# Patient Record
Sex: FEMALE | Race: BLACK OR AFRICAN AMERICAN | NOT HISPANIC OR LATINO | URBAN - METROPOLITAN AREA
[De-identification: names, ages, dates, MRNs, and addresses within clinical notes are randomized per-mention and may not be internally consistent; named-entity substitution may affect disease eponyms.]

---

## 2021-07-02 ENCOUNTER — INPATIENT (INPATIENT)
Facility: HOSPITAL | Age: 41
LOS: 5 days | Discharge: HOME | End: 2021-07-08
Attending: INTERNAL MEDICINE | Admitting: INTERNAL MEDICINE
Payer: COMMERCIAL

## 2021-07-02 VITALS
RESPIRATION RATE: 24 BRPM | HEART RATE: 113 BPM | WEIGHT: 220.02 LBS | DIASTOLIC BLOOD PRESSURE: 80 MMHG | SYSTOLIC BLOOD PRESSURE: 116 MMHG | TEMPERATURE: 97 F | OXYGEN SATURATION: 91 % | HEIGHT: 63 IN

## 2021-07-02 PROCEDURE — 99284 EMERGENCY DEPT VISIT MOD MDM: CPT

## 2021-07-02 RX ORDER — ACETAMINOPHEN 500 MG
650 TABLET ORAL ONCE
Refills: 0 | Status: COMPLETED | OUTPATIENT
Start: 2021-07-02 | End: 2021-07-02

## 2021-07-02 RX ORDER — ALBUTEROL 90 UG/1
2 AEROSOL, METERED ORAL ONCE
Refills: 0 | Status: COMPLETED | OUTPATIENT
Start: 2021-07-02 | End: 2021-07-02

## 2021-07-02 RX ORDER — SODIUM CHLORIDE 9 MG/ML
1000 INJECTION, SOLUTION INTRAVENOUS ONCE
Refills: 0 | Status: COMPLETED | OUTPATIENT
Start: 2021-07-02 | End: 2021-07-02

## 2021-07-02 RX ORDER — IPRATROPIUM BROMIDE 0.2 MG/ML
1 SOLUTION, NON-ORAL INHALATION ONCE
Refills: 0 | Status: COMPLETED | OUTPATIENT
Start: 2021-07-02 | End: 2021-07-02

## 2021-07-03 DIAGNOSIS — U07.1 COVID-19: ICD-10-CM

## 2021-07-03 LAB
ALBUMIN SERPL ELPH-MCNC: 2.9 G/DL — LOW (ref 3.3–5)
ALBUMIN SERPL ELPH-MCNC: 3.2 G/DL — LOW (ref 3.3–5)
ALP SERPL-CCNC: 51 U/L — SIGNIFICANT CHANGE UP (ref 40–120)
ALP SERPL-CCNC: 57 U/L — SIGNIFICANT CHANGE UP (ref 40–120)
ALT FLD-CCNC: 69 U/L — SIGNIFICANT CHANGE UP (ref 12–78)
ALT FLD-CCNC: 78 U/L — SIGNIFICANT CHANGE UP (ref 12–78)
ANION GAP SERPL CALC-SCNC: 9 MMOL/L — SIGNIFICANT CHANGE UP (ref 5–17)
APPEARANCE UR: CLEAR — SIGNIFICANT CHANGE UP
APTT BLD: 29.8 SEC — SIGNIFICANT CHANGE UP (ref 27.5–35.5)
AST SERPL-CCNC: 29 U/L — SIGNIFICANT CHANGE UP (ref 15–37)
AST SERPL-CCNC: 31 U/L — SIGNIFICANT CHANGE UP (ref 15–37)
BACTERIA # UR AUTO: ABNORMAL
BASOPHILS # BLD AUTO: 0 K/UL — SIGNIFICANT CHANGE UP (ref 0–0.2)
BASOPHILS NFR BLD AUTO: 0 % — SIGNIFICANT CHANGE UP (ref 0–2)
BILIRUB DIRECT SERPL-MCNC: 0.15 MG/DL — SIGNIFICANT CHANGE UP (ref 0.05–0.2)
BILIRUB INDIRECT FLD-MCNC: 0.2 MG/DL — SIGNIFICANT CHANGE UP (ref 0.2–1)
BILIRUB SERPL-MCNC: 0.4 MG/DL — SIGNIFICANT CHANGE UP (ref 0.2–1.2)
BILIRUB SERPL-MCNC: 0.4 MG/DL — SIGNIFICANT CHANGE UP (ref 0.2–1.2)
BILIRUB UR-MCNC: NEGATIVE — SIGNIFICANT CHANGE UP
BUN SERPL-MCNC: 8 MG/DL — SIGNIFICANT CHANGE UP (ref 7–23)
CALCIUM SERPL-MCNC: 9 MG/DL — SIGNIFICANT CHANGE UP (ref 8.5–10.1)
CHLORIDE SERPL-SCNC: 99 MMOL/L — SIGNIFICANT CHANGE UP (ref 96–108)
CK SERPL-CCNC: 232 U/L — HIGH (ref 26–192)
CO2 SERPL-SCNC: 27 MMOL/L — SIGNIFICANT CHANGE UP (ref 22–31)
COLOR SPEC: YELLOW — SIGNIFICANT CHANGE UP
CREAT SERPL-MCNC: 0.87 MG/DL — SIGNIFICANT CHANGE UP (ref 0.5–1.3)
D DIMER BLD IA.RAPID-MCNC: 529 NG/ML DDU — HIGH
DIFF PNL FLD: ABNORMAL
EOSINOPHIL # BLD AUTO: 0 K/UL — SIGNIFICANT CHANGE UP (ref 0–0.5)
EOSINOPHIL NFR BLD AUTO: 0 % — SIGNIFICANT CHANGE UP (ref 0–6)
EPI CELLS # UR: ABNORMAL
FERRITIN SERPL-MCNC: 272 NG/ML — HIGH (ref 15–150)
FIBRINOGEN PPP-MCNC: 780 MG/DL — HIGH (ref 290–520)
FLUAV AG NPH QL: SIGNIFICANT CHANGE UP
FLUBV AG NPH QL: SIGNIFICANT CHANGE UP
GLUCOSE SERPL-MCNC: 108 MG/DL — HIGH (ref 70–99)
GLUCOSE UR QL: NEGATIVE MG/DL — SIGNIFICANT CHANGE UP
HCG SERPL-ACNC: <1 MIU/ML — SIGNIFICANT CHANGE UP
HCT VFR BLD CALC: 38 % — SIGNIFICANT CHANGE UP (ref 34.5–45)
HGB BLD-MCNC: 12.4 G/DL — SIGNIFICANT CHANGE UP (ref 11.5–15.5)
INR BLD: 1.23 RATIO — HIGH (ref 0.88–1.16)
KETONES UR-MCNC: ABNORMAL
LACTATE SERPL-SCNC: 0.9 MMOL/L — SIGNIFICANT CHANGE UP (ref 0.7–2)
LEUKOCYTE ESTERASE UR-ACNC: ABNORMAL
LYMPHOCYTES # BLD AUTO: 1.73 K/UL — SIGNIFICANT CHANGE UP (ref 1–3.3)
LYMPHOCYTES # BLD AUTO: 44 % — SIGNIFICANT CHANGE UP (ref 13–44)
MANUAL SMEAR VERIFICATION: SIGNIFICANT CHANGE UP
MCHC RBC-ENTMCNC: 28.4 PG — SIGNIFICANT CHANGE UP (ref 27–34)
MCHC RBC-ENTMCNC: 32.6 GM/DL — SIGNIFICANT CHANGE UP (ref 32–36)
MCV RBC AUTO: 87 FL — SIGNIFICANT CHANGE UP (ref 80–100)
MONOCYTES # BLD AUTO: 0.39 K/UL — SIGNIFICANT CHANGE UP (ref 0–0.9)
MONOCYTES NFR BLD AUTO: 10 % — SIGNIFICANT CHANGE UP (ref 2–14)
NEUTROPHILS # BLD AUTO: 1.69 K/UL — LOW (ref 1.8–7.4)
NEUTROPHILS NFR BLD AUTO: 43 % — SIGNIFICANT CHANGE UP (ref 43–77)
NITRITE UR-MCNC: NEGATIVE — SIGNIFICANT CHANGE UP
NRBC # BLD: 0 /100 — SIGNIFICANT CHANGE UP (ref 0–0)
NRBC # BLD: SIGNIFICANT CHANGE UP /100 WBCS (ref 0–0)
NT-PROBNP SERPL-SCNC: 14 PG/ML — SIGNIFICANT CHANGE UP (ref 0–125)
PH UR: 5 — SIGNIFICANT CHANGE UP (ref 5–8)
PLAT MORPH BLD: NORMAL — SIGNIFICANT CHANGE UP
PLATELET # BLD AUTO: 252 K/UL — SIGNIFICANT CHANGE UP (ref 150–400)
POTASSIUM SERPL-MCNC: 4.1 MMOL/L — SIGNIFICANT CHANGE UP (ref 3.5–5.3)
POTASSIUM SERPL-SCNC: 4.1 MMOL/L — SIGNIFICANT CHANGE UP (ref 3.5–5.3)
PROCALCITONIN SERPL-MCNC: 0.04 NG/ML — SIGNIFICANT CHANGE UP (ref 0.02–0.1)
PROT SERPL-MCNC: 7.8 GM/DL — SIGNIFICANT CHANGE UP (ref 6–8.3)
PROT SERPL-MCNC: 8.6 GM/DL — HIGH (ref 6–8.3)
PROT UR-MCNC: 30 MG/DL
PROTHROM AB SERPL-ACNC: 14.1 SEC — HIGH (ref 10.6–13.6)
RBC # BLD: 4.37 M/UL — SIGNIFICANT CHANGE UP (ref 3.8–5.2)
RBC # FLD: 13.4 % — SIGNIFICANT CHANGE UP (ref 10.3–14.5)
RBC BLD AUTO: NORMAL — SIGNIFICANT CHANGE UP
RBC CASTS # UR COMP ASSIST: ABNORMAL /HPF (ref 0–4)
SARS-COV-2 RNA SPEC QL NAA+PROBE: DETECTED
SODIUM SERPL-SCNC: 135 MMOL/L — SIGNIFICANT CHANGE UP (ref 135–145)
SP GR SPEC: 1.02 — SIGNIFICANT CHANGE UP (ref 1.01–1.02)
TROPONIN I SERPL-MCNC: <.015 NG/ML — SIGNIFICANT CHANGE UP (ref 0.01–0.04)
UROBILINOGEN FLD QL: 1 MG/DL
VARIANT LYMPHS # BLD: 3 % — SIGNIFICANT CHANGE UP (ref 0–6)
WBC # BLD: 3.93 K/UL — SIGNIFICANT CHANGE UP (ref 3.8–10.5)
WBC # FLD AUTO: 3.93 K/UL — SIGNIFICANT CHANGE UP (ref 3.8–10.5)
WBC UR QL: SIGNIFICANT CHANGE UP

## 2021-07-03 PROCEDURE — 71275 CT ANGIOGRAPHY CHEST: CPT | Mod: 26

## 2021-07-03 PROCEDURE — 71045 X-RAY EXAM CHEST 1 VIEW: CPT | Mod: 26

## 2021-07-03 PROCEDURE — 99222 1ST HOSP IP/OBS MODERATE 55: CPT

## 2021-07-03 PROCEDURE — 12345: CPT | Mod: NC

## 2021-07-03 RX ORDER — REMDESIVIR 5 MG/ML
INJECTION INTRAVENOUS
Refills: 0 | Status: COMPLETED | OUTPATIENT
Start: 2021-07-03 | End: 2021-07-07

## 2021-07-03 RX ORDER — REMDESIVIR 5 MG/ML
200 INJECTION INTRAVENOUS EVERY 24 HOURS
Refills: 0 | Status: COMPLETED | OUTPATIENT
Start: 2021-07-03 | End: 2021-07-03

## 2021-07-03 RX ORDER — DEXAMETHASONE 0.5 MG/5ML
6 ELIXIR ORAL DAILY
Refills: 0 | Status: DISCONTINUED | OUTPATIENT
Start: 2021-07-03 | End: 2021-07-08

## 2021-07-03 RX ORDER — ACETAMINOPHEN 500 MG
650 TABLET ORAL EVERY 6 HOURS
Refills: 0 | Status: DISCONTINUED | OUTPATIENT
Start: 2021-07-03 | End: 2021-07-08

## 2021-07-03 RX ORDER — ENOXAPARIN SODIUM 100 MG/ML
40 INJECTION SUBCUTANEOUS EVERY 12 HOURS
Refills: 0 | Status: DISCONTINUED | OUTPATIENT
Start: 2021-07-03 | End: 2021-07-08

## 2021-07-03 RX ORDER — ENOXAPARIN SODIUM 100 MG/ML
40 INJECTION SUBCUTANEOUS DAILY
Refills: 0 | Status: DISCONTINUED | OUTPATIENT
Start: 2021-07-03 | End: 2021-07-03

## 2021-07-03 RX ORDER — REMDESIVIR 5 MG/ML
100 INJECTION INTRAVENOUS EVERY 24 HOURS
Refills: 0 | Status: COMPLETED | OUTPATIENT
Start: 2021-07-04 | End: 2021-07-07

## 2021-07-03 RX ORDER — SENNA PLUS 8.6 MG/1
2 TABLET ORAL AT BEDTIME
Refills: 0 | Status: DISCONTINUED | OUTPATIENT
Start: 2021-07-03 | End: 2021-07-03

## 2021-07-03 RX ADMIN — REMDESIVIR 200 MILLIGRAM(S): 5 INJECTION INTRAVENOUS at 05:42

## 2021-07-03 RX ADMIN — REMDESIVIR 500 MILLIGRAM(S): 5 INJECTION INTRAVENOUS at 03:59

## 2021-07-03 RX ADMIN — SODIUM CHLORIDE 1000 MILLILITER(S): 9 INJECTION, SOLUTION INTRAVENOUS at 04:00

## 2021-07-03 RX ADMIN — ENOXAPARIN SODIUM 40 MILLIGRAM(S): 100 INJECTION SUBCUTANEOUS at 17:41

## 2021-07-03 RX ADMIN — Medication 100 MILLIGRAM(S): at 00:55

## 2021-07-03 RX ADMIN — Medication 6 MILLIGRAM(S): at 08:09

## 2021-07-03 RX ADMIN — ENOXAPARIN SODIUM 40 MILLIGRAM(S): 100 INJECTION SUBCUTANEOUS at 08:08

## 2021-07-03 RX ADMIN — Medication 650 MILLIGRAM(S): at 03:57

## 2021-07-03 RX ADMIN — Medication 1 PUFF(S): at 01:00

## 2021-07-03 RX ADMIN — Medication 650 MILLIGRAM(S): at 00:55

## 2021-07-03 RX ADMIN — ALBUTEROL 2 PUFF(S): 90 AEROSOL, METERED ORAL at 00:55

## 2021-07-03 RX ADMIN — SODIUM CHLORIDE 1000 MILLILITER(S): 9 INJECTION, SOLUTION INTRAVENOUS at 00:54

## 2021-07-03 NOTE — H&P ADULT - ASSESSMENT
Patient is a 40F with no reported PMH who presents to the ED for dyspnea.  Patient admitted for evaluation for COVID19.  Tested positive for COVID on 6/20, quarantined in [place] for 7 days and returned to [facility].  After she returned from [place], symptoms continued with a cough and has progressed to worsening dyspnea over the last few days.  Symptoms worse with activity.  Patient denies hx of fever, chills, nausea, vomiting, or diarrhea.  Patient spoke to her PMDF today who recommended she come to the ED.  Nonsmoker, allergies as per chart. SpO2 as low as 91 on room air.  Vitals stable, labs benign.  Will admit to med surg.    IMPROVE VTE Individual Risk Assessment          RISK                                                          Points  [  ] Previous VTE                                                3  [  ] Thrombophilia                                             2  [  ] Lower limb paralysis                                    2        (unable to hold up >15 seconds)    [  ] Current Cancer                                             2         (within 6 months)  [  ] Immobilization > 24 hrs                              1  [  ] ICU/CCU stay > 24 hours                            1  [  ] Age > 60                                                    1    IMPROVE VTE Score - 0

## 2021-07-03 NOTE — H&P ADULT - PROBLEM SELECTOR PLAN 1
COVID 19 swabbed in ED - +ve results.  Isolation precautions  Tylenol PRN fever  Will start dexamethasone and remdesivir as patient requiring supplemental oxygen

## 2021-07-03 NOTE — H&P ADULT - NSHPPHYSICALEXAM_GEN_ALL_CORE
Physical exam:  General: patient in no acute distress, resting comfortably  Head:  Atraumatic, Normocephalic  Eyes: EOMI, PERRLA, clear sclera  Neck: Supple, thyroid nontender, non enlarged  Cardio: deferred  Resp: deferred  GI: abdomen soft, nontender, non distended, no guarding, BS +ve x 4  Ext: no significant pedal edema  Neuro: CN 2-12 intact, no significant motor or sensory deficits.  Skin: No rashes or lesions

## 2021-07-03 NOTE — PATIENT PROFILE ADULT - NSPRESCRALCAMT_GEN_A_NUR
Patient Seen in: [facility] AND [facility] Emergency Department    History   Patient presents with:  Abscess (integumentary)      HPI    Patient presents complaining of an infection to her left upper back for the past 2 days.   She states there is significant rj [id number]    Physical Exam   Constitutional: She is oriented to person, place, and time. She appears well-developed. No distress. Morbidly obese   HENT:   Head: Normocephalic and atraumatic. Eyes: Conjunctivae are normal. Right eye exhibits no discharge.  L patient's abscess was located to her left upper back. I obtained verbal consent from the patient to drain the abscess. Patient was informed about the possibility of bleeding, pain and worsening of the condition.   The abscess was incised with a scalpel  a 3 or 4

## 2021-07-03 NOTE — H&P ADULT - NSHPREVIEWOFSYSTEMS_GEN_ALL_CORE
Constitutional: no fever, chills, night sweats  Ears: no hearing changes or ear pain,   Nose: no nasal congestion, sinus pain, or rhinorrhea  Cardio: no chest pain, orthopnea, edema, or palpitations  Resp: dyspnea, cough positive.  No wheezing  GI: no nausea, vomiting, diarrhea, constipation, hematochezia, or melena  : no dysuria, urinary frequency, hematuria  MSK: no back pain, neck pain  Skin: no rash, pruritis   Neuro: weakness positive.  No dizziness, lightheadedness, syncope   Heme/Lymph: no bruising or bleeding

## 2021-07-03 NOTE — ED PROVIDER NOTE - PHYSICAL EXAMINATION
Gen: Alert, NAD  Head: NC, AT   Eyes: PERRL, EOMI, normal lids/conjunctiva  ENT: normal hearing, patent oropharynx without erythema/exudate, uvula midline  Neck: supple, no tenderness, Trachea midline  Pulm: right lung dimnished breath sounds with crackles   CV: RRR, no M/R/G, 2+ radial and dp pulses bl, no edema  Abd: soft, NT/ND, +BS, no hepatosplenomegaly  Mskel: extremities x4 with normal ROM and no joint effusions. no ctl spine ttp.   Skin: no rash, no bruising   Neuro: AAOx3, no sensory/motor deficits, CN 2-12 intact

## 2021-07-03 NOTE — ED PROVIDER NOTE - CLINICAL SUMMARY MEDICAL DECISION MAKING FREE TEXT BOX
right lower lobe pna likely related to covid. rule out pe. start remdes. not hypoxic for dex. right lower lobe pna likely related to covid. rule out pe. start remdes. not sufficiently hypoxic for dex.

## 2021-07-03 NOTE — H&P ADULT - NSHPLABSRESULTS_GEN_ALL_CORE
Recent Vitals  T(C): 36.7 (07-03-21 @ 06:16), Max: 37.2 (07-03-21 @ 04:03)  HR: 81 (07-03-21 @ 06:16) (81 - 113)  BP: 122/74 (07-03-21 @ 06:16) (116/80 - 130/78)  RR: 14 (07-03-21 @ 06:16) (14 - 24)  SpO2: 96% (07-03-21 @ 06:16) (91% - 100%)                        12.4   3.93  )-----------( 252      ( 03 Jul 2021 00:29 )             38.0     07-03    135  |  99  |  8   ----------------------------<  108<H>  4.1   |  27  |  0.87    Ca    9.0      03 Jul 2021 00:29    TPro  8.6<H>  /  Alb  3.2<L>  /  TBili  0.4  /  DBili  x   /  AST  31  /  ALT  78  /  AlkPhos  57  07-03    PT/INR - ( 03 Jul 2021 00:29 )   PT: 14.1 sec;   INR: 1.23 ratio         PTT - ( 03 Jul 2021 00:29 )  PTT:29.8 sec  LIVER FUNCTIONS - ( 03 Jul 2021 00:29 )  Alb: 3.2 g/dL / Pro: 8.6 gm/dL / ALK PHOS: 57 U/L / ALT: 78 U/L / AST: 31 U/L / GGT: x               Home Medications:

## 2021-07-03 NOTE — H&P ADULT - HISTORY OF PRESENT ILLNESS
Patient is a 40F with no reported PMH who presents to the ED for dyspnea.  Patient admitted for evaluation for COVID19.  Tested positive for COVID on 6/20 after she returned from [place].  Symptoms started wtih a cough but patient has developed worsening dyspnea over the last few days.  Symptoms worse with activity.  Patient denies hx of fever, chills, nausea, vomiting, or diarrhea.  Patient spoke to her PMDF today who recommended she come to the ED.  Nonsmoker, allergies as per chart.  Vitals stable, labs benign.  Will admit to med surg. Patient is a 40F with no reported PMH who presents to the ED for dyspnea.  Patient admitted for evaluation for COVID19.  Tested positive for COVID on 6/20, quarantined in [place] for 7 days and returned to [facility].  After she returned from [place], symptoms continued with a cough and has progressed to worsening dyspnea over the last few days.  Symptoms worse with activity.  Patient denies hx of fever, chills, nausea, vomiting, or diarrhea.  Patient spoke to her PMDF today who recommended she come to the ED.  Nonsmoker, allergies as per chart. SpO2 as low as 91 on room air.  Vitals stable, labs benign.  Will admit to med surg.

## 2021-07-03 NOTE — ED PROVIDER NOTE - OBJECTIVE STATEMENT
40f no med hx but dx with covid on june 20 after symptoms began that day with a cough. since then pt developed sob over the past few days, worse in particular with ambulation. cough persists. no fever, chills, nausea, vomiting, back pain, dysuria, pregnancy, vision loss, ha. there is no chest tightness. has not taken anything to help with symptoms

## 2021-07-04 LAB
ALBUMIN SERPL ELPH-MCNC: 2.9 G/DL — LOW (ref 3.3–5)
ALBUMIN SERPL ELPH-MCNC: 3 G/DL — LOW (ref 3.3–5)
ALP SERPL-CCNC: 53 U/L — SIGNIFICANT CHANGE UP (ref 40–120)
ALP SERPL-CCNC: 54 U/L — SIGNIFICANT CHANGE UP (ref 40–120)
ALT FLD-CCNC: 60 U/L — SIGNIFICANT CHANGE UP (ref 12–78)
ALT FLD-CCNC: 60 U/L — SIGNIFICANT CHANGE UP (ref 12–78)
ANION GAP SERPL CALC-SCNC: 7 MMOL/L — SIGNIFICANT CHANGE UP (ref 5–17)
AST SERPL-CCNC: 18 U/L — SIGNIFICANT CHANGE UP (ref 15–37)
AST SERPL-CCNC: 19 U/L — SIGNIFICANT CHANGE UP (ref 15–37)
BILIRUB DIRECT SERPL-MCNC: 0.12 MG/DL — SIGNIFICANT CHANGE UP (ref 0.05–0.2)
BILIRUB INDIRECT FLD-MCNC: 0.2 MG/DL — SIGNIFICANT CHANGE UP (ref 0.2–1)
BILIRUB SERPL-MCNC: 0.3 MG/DL — SIGNIFICANT CHANGE UP (ref 0.2–1.2)
BILIRUB SERPL-MCNC: 0.3 MG/DL — SIGNIFICANT CHANGE UP (ref 0.2–1.2)
BUN SERPL-MCNC: 10 MG/DL — SIGNIFICANT CHANGE UP (ref 7–23)
CALCIUM SERPL-MCNC: 9 MG/DL — SIGNIFICANT CHANGE UP (ref 8.5–10.1)
CHLORIDE SERPL-SCNC: 105 MMOL/L — SIGNIFICANT CHANGE UP (ref 96–108)
CK SERPL-CCNC: 126 U/L — SIGNIFICANT CHANGE UP (ref 26–192)
CO2 SERPL-SCNC: 27 MMOL/L — SIGNIFICANT CHANGE UP (ref 22–31)
COVID-19 SPIKE DOMAIN AB INTERP: POSITIVE
COVID-19 SPIKE DOMAIN ANTIBODY RESULT: 41.7 U/ML — HIGH
CREAT SERPL-MCNC: 0.6 MG/DL — SIGNIFICANT CHANGE UP (ref 0.5–1.3)
CREAT SERPL-MCNC: 0.64 MG/DL — SIGNIFICANT CHANGE UP (ref 0.5–1.3)
CRP SERPL-MCNC: 103 MG/L — HIGH
CRP SERPL-MCNC: 44 MG/L — HIGH
D DIMER BLD IA.RAPID-MCNC: 493 NG/ML DDU — HIGH
GLUCOSE SERPL-MCNC: 123 MG/DL — HIGH (ref 70–99)
INR BLD: 1.26 RATIO — HIGH (ref 0.88–1.16)
LDH SERPL L TO P-CCNC: 663 U/L — HIGH (ref 50–242)
MAGNESIUM SERPL-MCNC: 2.5 MG/DL — SIGNIFICANT CHANGE UP (ref 1.6–2.6)
PHOSPHATE SERPL-MCNC: 3.4 MG/DL — SIGNIFICANT CHANGE UP (ref 2.5–4.5)
POTASSIUM SERPL-MCNC: 4.2 MMOL/L — SIGNIFICANT CHANGE UP (ref 3.5–5.3)
POTASSIUM SERPL-SCNC: 4.2 MMOL/L — SIGNIFICANT CHANGE UP (ref 3.5–5.3)
PROT SERPL-MCNC: 7.8 GM/DL — SIGNIFICANT CHANGE UP (ref 6–8.3)
PROT SERPL-MCNC: 7.9 GM/DL — SIGNIFICANT CHANGE UP (ref 6–8.3)
PROTHROM AB SERPL-ACNC: 14.5 SEC — HIGH (ref 10.6–13.6)
SARS-COV-2 IGG+IGM SERPL QL IA: 41.7 U/ML — HIGH
SARS-COV-2 IGG+IGM SERPL QL IA: POSITIVE
SODIUM SERPL-SCNC: 139 MMOL/L — SIGNIFICANT CHANGE UP (ref 135–145)

## 2021-07-04 PROCEDURE — 99232 SBSQ HOSP IP/OBS MODERATE 35: CPT

## 2021-07-04 RX ADMIN — ENOXAPARIN SODIUM 40 MILLIGRAM(S): 100 INJECTION SUBCUTANEOUS at 17:02

## 2021-07-04 RX ADMIN — REMDESIVIR 500 MILLIGRAM(S): 5 INJECTION INTRAVENOUS at 02:54

## 2021-07-04 RX ADMIN — Medication 6 MILLIGRAM(S): at 05:03

## 2021-07-04 RX ADMIN — ENOXAPARIN SODIUM 40 MILLIGRAM(S): 100 INJECTION SUBCUTANEOUS at 05:03

## 2021-07-04 NOTE — PROGRESS NOTE ADULT - ASSESSMENT
40F with no reported PMH admitted with COVID-19.         Assessment and Plan:   Acute respiratory failure sec to COVID-19   CT angio - GGO, no PE   elevated inflamm markers   Tylenol PRN fever  c/w dexamethasone and remdesivir as patient requiring supplemental oxygen.     Precautions per protocol, ideally airborne and contact in negative pressure room. Supplemental oxygen as required to maintain adequate saturation. Vigilance for secondary infection and other superimposed events. Monitor inflammatory markers and lab data. DVT prophylaxis per protocol. Prognosis guarded. Encourage use of incentive spirometer.  proning as tolerated.

## 2021-07-04 NOTE — PROGRESS NOTE ADULT - SUBJECTIVE AND OBJECTIVE BOX
HPI:  Patient is a 40F with no reported PMH who presents to the ED for dyspnea.  Patient admitted for evaluation for COVID19.  Tested positive for COVID on , quarantined in [place] for 7 days and returned to [facility].  After she returned from [place], symptoms continued with a cough and has progressed to worsening dyspnea over the last few days.  Symptoms worse with activity.  Patient denies hx of fever, chills, nausea, vomiting, or diarrhea.  Patient spoke to her PMDF today who recommended she come to the ED.  Nonsmoker, allergies as per chart. SpO2 as low as 91 on room air.  Vitals stable, labs benign.  Will admit to med surg. (2021 06:23)      SUBJECTIVE & OBJECTIVE: Pt seen and examined at bedside.   no overnight events.   states still feels SOB on exertion     Denies fever, chills, N/V, dizziness, HA, cough, CP, palpitations, abdominal pain, dysuria, diarrhea, constipation.     PHYSICAL EXAM:  T(C): 36.3 (21 @ 05:09), Max: 36.7 (21 @ 12:00)  HR: 60 (21 @ 05:09) (60 - 90)  BP: 117/73 (21 @ 05:09) (105/80 - 129/87)  RR: 18 (21 @ 05:09) (18 - 21)  SpO2: 99% (21 @ 05:09) (94% - 99%)  Wt(kg): -- Height (cm): 165.1 ( 21:19)  Weight (kg): 100.6 ( 21:19)  BMI (kg/m2): 36.9 ( 21:19)  BSA (m2): 2.07 ( 21:19)  I&O's Detail    2021 07:01  -  2021 07:00  --------------------------------------------------------  IN:    IV PiggyBack: 250 mL    Oral Fluid: 1157 mL  Total IN: 1407 mL    OUT:    Voided (mL): 1100 mL  Total OUT: 1100 mL    Total NET: 307 mL    GENERAL: NAD well-developed,  not using accessory muscles, speaking in full sentences   HEAD:  Atraumatic, Normocephalic  EYES: EOMI, PERRLA, conjunctiva and sclera clear  ENMT: No tonsillar erythema, exudates, or enlargement; Moist mucous membranes  NECK: Supple, No JVD  NERVOUS SYSTEM:  Alert & Oriented X3, Good concentration; no focal neuro deficits   CHEST/LUNG: CTAB, no r/r/w  HEART: Regular rate and rhythm; No murmurs, rubs, or gallops  ABDOMEN: Soft, Nontender, Nondistended; Bowel sounds present  EXTREMITIES:  2+ Peripheral Pulses b/l, No clubbing, cyanosis, calf tenderness or edema b/l           MEDICATIONS  (STANDING):  dexAMETHasone  Injectable 6 milliGRAM(s) IV Push daily  enoxaparin Injectable 40 milliGRAM(s) SubCutaneous every 12 hours  remdesivir  IVPB   IV Intermittent   remdesivir  IVPB 100 milliGRAM(s) IV Intermittent every 24 hours    MEDICATIONS  (PRN):  acetaminophen   Tablet .. 650 milliGRAM(s) Oral every 6 hours PRN Moderate Pain (4 - 6)      LABS:                        12.4   3.93  )-----------( 252      ( 2021 00:29 )             38.0     07-03    135  |  99  |  8   ----------------------------<  108<H>  4.1   |  27  |  0.87    Ca    9.0      2021 00:29    TPro  7.8  /  Alb  2.9<L>  /  TBili  0.4  /  DBili  .15  /  AST  29  /  ALT  69  /  AlkPhos  51  07-03    PT/INR - ( 2021 00:29 )   PT: 14.1 sec;   INR: 1.23 ratio         PTT - ( 2021 00:29 )  PTT:29.8 sec  Urinalysis Basic - ( 2021 06:30 )    Color: Yellow / Appearance: Clear / S.020 / pH: x  Gluc: x / Ketone: Moderate  / Bili: Negative / Urobili: 1 mg/dL   Blood: x / Protein: 30 mg/dL / Nitrite: Negative   Leuk Esterase: Trace / RBC: 3-5 /HPF / WBC 0-2   Sq Epi: x / Non Sq Epi: Moderate / Bacteria: Moderate        CAPILLARY BLOOD GLUCOSE          CARDIAC MARKERS ( 2021 00:02 )  x     / x     / 126 U/L / x     / x      CARDIAC MARKERS ( 2021 00:29 )  <.015 ng/mL / x     / 232 U/L / x     / x            RECENT CULTURES:      RADIOLOGY & ADDITIONAL TESTS:  < from: CT Angio Chest PE Protocol w/ IV Cont (21 @ 06:35) >  INTERPRETATION:  INDICATION, CLINICAL INFORMATION: Shortness of breath, COVID19 infection    TECHNIQUE: CT pulmonary angiogram of the chest was performed. Coronal and sagittal images were reconstructed. Maximum intensity projection images were generated. Images were obtained after the uneventful administration of 99 cc nonionic intravenous Omnipaque 350. 1 cc of Omnipaque 350 was discarded.      COMPARISON: 2015.    FINDINGS:    PULMONARY VESSELS: No pulmonary embolus. Main pulmonary artery normal in diameter.    HEART AND VASCULATURE: Heart size is normal. No pericardial effusion. No aortic aneurysm or dissection.    LUNGS, AIRWAYS, PLEURA: Patent central airways. Mild basilar predominant reticular and groundglass opacities. No pleural effusions or pneumothorax.    MEDIASTINUM: Borderline and subcentimeter bilateral hilar lymph nodes.    UPPER ABDOMEN: Within normal limits.    BONES AND SOFT TISSUES: No aggressive osseous lesion.    LOWER NECK: Within normal limits.    IMPRESSION:    No pulmonary embolus.    Mild basilar predominant reticular and groundglass opacities.    < end of copied text >    UA contaminated, patient asymptomatic, no urinary complaints          Imaging Personally Reviewed:  [ ] YES  [ ] NO    Consultant(s) Notes Reviewed:  [x ] YES  [ ] NO    Care Discussed with Consultants/Other Providers [x ] YES  [ ] NO  Care discussed in detail with patient.  All questions and concerns addressed

## 2021-07-05 LAB
ALBUMIN SERPL ELPH-MCNC: 2.9 G/DL — LOW (ref 3.3–5)
ALP SERPL-CCNC: 48 U/L — SIGNIFICANT CHANGE UP (ref 40–120)
ALT FLD-CCNC: 47 U/L — SIGNIFICANT CHANGE UP (ref 12–78)
ANION GAP SERPL CALC-SCNC: 5 MMOL/L — SIGNIFICANT CHANGE UP (ref 5–17)
AST SERPL-CCNC: 11 U/L — LOW (ref 15–37)
BILIRUB DIRECT SERPL-MCNC: 0.09 MG/DL — SIGNIFICANT CHANGE UP (ref 0.05–0.2)
BILIRUB SERPL-MCNC: 0.2 MG/DL — SIGNIFICANT CHANGE UP (ref 0.2–1.2)
BUN SERPL-MCNC: 12 MG/DL — SIGNIFICANT CHANGE UP (ref 7–23)
CALCIUM SERPL-MCNC: 8.5 MG/DL — SIGNIFICANT CHANGE UP (ref 8.5–10.1)
CHLORIDE SERPL-SCNC: 107 MMOL/L — SIGNIFICANT CHANGE UP (ref 96–108)
CO2 SERPL-SCNC: 29 MMOL/L — SIGNIFICANT CHANGE UP (ref 22–31)
CREAT SERPL-MCNC: 0.64 MG/DL — SIGNIFICANT CHANGE UP (ref 0.5–1.3)
GLUCOSE SERPL-MCNC: 115 MG/DL — HIGH (ref 70–99)
HCT VFR BLD CALC: 33.1 % — LOW (ref 34.5–45)
HGB BLD-MCNC: 10.6 G/DL — LOW (ref 11.5–15.5)
INR BLD: 1.36 RATIO — HIGH (ref 0.88–1.16)
MAGNESIUM SERPL-MCNC: 2.5 MG/DL — SIGNIFICANT CHANGE UP (ref 1.6–2.6)
MCHC RBC-ENTMCNC: 28 PG — SIGNIFICANT CHANGE UP (ref 27–34)
MCHC RBC-ENTMCNC: 32 GM/DL — SIGNIFICANT CHANGE UP (ref 32–36)
MCV RBC AUTO: 87.3 FL — SIGNIFICANT CHANGE UP (ref 80–100)
NRBC # BLD: 0 /100 WBCS — SIGNIFICANT CHANGE UP (ref 0–0)
PHOSPHATE SERPL-MCNC: 3.2 MG/DL — SIGNIFICANT CHANGE UP (ref 2.5–4.5)
PLATELET # BLD AUTO: 327 K/UL — SIGNIFICANT CHANGE UP (ref 150–400)
POTASSIUM SERPL-MCNC: 4.1 MMOL/L — SIGNIFICANT CHANGE UP (ref 3.5–5.3)
POTASSIUM SERPL-SCNC: 4.1 MMOL/L — SIGNIFICANT CHANGE UP (ref 3.5–5.3)
PROT SERPL-MCNC: 7.2 GM/DL — SIGNIFICANT CHANGE UP (ref 6–8.3)
PROTHROM AB SERPL-ACNC: 15.6 SEC — HIGH (ref 10.6–13.6)
RBC # BLD: 3.79 M/UL — LOW (ref 3.8–5.2)
RBC # FLD: 13.4 % — SIGNIFICANT CHANGE UP (ref 10.3–14.5)
SODIUM SERPL-SCNC: 141 MMOL/L — SIGNIFICANT CHANGE UP (ref 135–145)
WBC # BLD: 6.92 K/UL — SIGNIFICANT CHANGE UP (ref 3.8–10.5)
WBC # FLD AUTO: 6.92 K/UL — SIGNIFICANT CHANGE UP (ref 3.8–10.5)

## 2021-07-05 PROCEDURE — 99232 SBSQ HOSP IP/OBS MODERATE 35: CPT

## 2021-07-05 RX ORDER — PANTOPRAZOLE SODIUM 20 MG/1
40 TABLET, DELAYED RELEASE ORAL
Refills: 0 | Status: DISCONTINUED | OUTPATIENT
Start: 2021-07-05 | End: 2021-07-08

## 2021-07-05 RX ADMIN — PANTOPRAZOLE SODIUM 40 MILLIGRAM(S): 20 TABLET, DELAYED RELEASE ORAL at 05:37

## 2021-07-05 RX ADMIN — REMDESIVIR 500 MILLIGRAM(S): 5 INJECTION INTRAVENOUS at 02:58

## 2021-07-05 RX ADMIN — ENOXAPARIN SODIUM 40 MILLIGRAM(S): 100 INJECTION SUBCUTANEOUS at 05:37

## 2021-07-05 RX ADMIN — Medication 6 MILLIGRAM(S): at 05:37

## 2021-07-05 RX ADMIN — ENOXAPARIN SODIUM 40 MILLIGRAM(S): 100 INJECTION SUBCUTANEOUS at 17:24

## 2021-07-05 RX ADMIN — Medication 30 MILLILITER(S): at 00:38

## 2021-07-05 NOTE — DIETITIAN INITIAL EVALUATION ADULT. - ORAL INTAKE PTA/DIET HISTORY
Pt reports poor appetite and PO intake x 7 days PTA due to feeling unwell. States no diet restrictions PTA.

## 2021-07-05 NOTE — DIETITIAN NUTRITION RISK NOTIFICATION - TREATMENT: THE FOLLOWING DIET HAS BEEN RECOMMENDED
Diet, Regular:   Supplement Feeding Modality:  Oral  Ensure Enlive Cans or Servings Per Day:  1       Frequency:  Daily (07-05-21 @ 14:58) [Pending Verification By Attending]  Diet, Regular (07-03-21 @ 08:56) [Active]

## 2021-07-05 NOTE — DIETITIAN INITIAL EVALUATION ADULT. - PERTINENT MEDS FT
MEDICATIONS  (STANDING):  dexAMETHasone  Injectable 6 milliGRAM(s) IV Push daily  enoxaparin Injectable 40 milliGRAM(s) SubCutaneous every 12 hours  pantoprazole    Tablet 40 milliGRAM(s) Oral before breakfast  remdesivir  IVPB   IV Intermittent   remdesivir  IVPB 100 milliGRAM(s) IV Intermittent every 24 hours    MEDICATIONS  (PRN):  acetaminophen   Tablet .. 650 milliGRAM(s) Oral every 6 hours PRN Moderate Pain (4 - 6)

## 2021-07-05 NOTE — DIETITIAN INITIAL EVALUATION ADULT. - OTHER INFO
Pt COVID+; on contact isolation. Met with pt at bedside. Pt reports good appetite and PO intake during LOS. Per flow sheets pt consuming % of documented meals. Denies difficulty chewing/swallowing. Pt denies nausea, vomiting, diarrhea, or constipation. States weight loss;  pounds. Weight loss as noted below.   Pt offered and amenable to Ensure Enlive x 1/day (provides 350 kcal, 20 g protein); preferences taken and relayed to diet office. Made aware RD remains available.

## 2021-07-05 NOTE — DIETITIAN INITIAL EVALUATION ADULT. - OTHER CALCULATIONS
Ht (cm): 165.1cm   Wt (kg): 100.6kg (07/03)   BMI: 36.9     IBW: 56.6kg +/- 10% %IBW: 178% UBW: 104.3kg %UBW: 96%

## 2021-07-05 NOTE — DIETITIAN INITIAL EVALUATION ADULT. - PERTINENT LABORATORY DATA
07-05 Na141 mmol/L Glu 115 mg/dL<H> K+ 4.1 mmol/L Cr  0.64 mg/dL BUN 12 mg/dL 07-05 Phos 3.2 mg/dL 07-05 Alb 2.9 g/dL<L>

## 2021-07-06 LAB
-  AMIKACIN: SIGNIFICANT CHANGE UP
-  AMOXICILLIN/CLAVULANIC ACID: SIGNIFICANT CHANGE UP
-  AMPICILLIN/SULBACTAM: SIGNIFICANT CHANGE UP
-  AMPICILLIN: SIGNIFICANT CHANGE UP
-  AZTREONAM: SIGNIFICANT CHANGE UP
-  CEFAZOLIN: SIGNIFICANT CHANGE UP
-  CEFEPIME: SIGNIFICANT CHANGE UP
-  CEFOXITIN: SIGNIFICANT CHANGE UP
-  CEFTRIAXONE: SIGNIFICANT CHANGE UP
-  CIPROFLOXACIN: SIGNIFICANT CHANGE UP
-  ERTAPENEM: SIGNIFICANT CHANGE UP
-  GENTAMICIN: SIGNIFICANT CHANGE UP
-  IMIPENEM: SIGNIFICANT CHANGE UP
-  LEVOFLOXACIN: SIGNIFICANT CHANGE UP
-  MEROPENEM: SIGNIFICANT CHANGE UP
-  NITROFURANTOIN: SIGNIFICANT CHANGE UP
-  PIPERACILLIN/TAZOBACTAM: SIGNIFICANT CHANGE UP
-  TIGECYCLINE: SIGNIFICANT CHANGE UP
-  TOBRAMYCIN: SIGNIFICANT CHANGE UP
-  TRIMETHOPRIM/SULFAMETHOXAZOLE: SIGNIFICANT CHANGE UP
ALBUMIN SERPL ELPH-MCNC: 2.7 G/DL — LOW (ref 3.3–5)
ALP SERPL-CCNC: 51 U/L — SIGNIFICANT CHANGE UP (ref 40–120)
ALT FLD-CCNC: 46 U/L — SIGNIFICANT CHANGE UP (ref 12–78)
ANION GAP SERPL CALC-SCNC: 4 MMOL/L — LOW (ref 5–17)
AST SERPL-CCNC: 6 U/L — LOW (ref 15–37)
BILIRUB DIRECT SERPL-MCNC: 0.08 MG/DL — SIGNIFICANT CHANGE UP (ref 0.05–0.2)
BILIRUB SERPL-MCNC: 0.2 MG/DL — SIGNIFICANT CHANGE UP (ref 0.2–1.2)
BUN SERPL-MCNC: 14 MG/DL — SIGNIFICANT CHANGE UP (ref 7–23)
CALCIUM SERPL-MCNC: 8.3 MG/DL — LOW (ref 8.5–10.1)
CHLORIDE SERPL-SCNC: 105 MMOL/L — SIGNIFICANT CHANGE UP (ref 96–108)
CO2 SERPL-SCNC: 29 MMOL/L — SIGNIFICANT CHANGE UP (ref 22–31)
CREAT SERPL-MCNC: 0.82 MG/DL — SIGNIFICANT CHANGE UP (ref 0.5–1.3)
CULTURE RESULTS: SIGNIFICANT CHANGE UP
FERRITIN SERPL-MCNC: 169 NG/ML — HIGH (ref 15–150)
GLUCOSE SERPL-MCNC: 114 MG/DL — HIGH (ref 70–99)
HCT VFR BLD CALC: 34.1 % — LOW (ref 34.5–45)
HGB BLD-MCNC: 10.7 G/DL — LOW (ref 11.5–15.5)
INR BLD: 1.42 RATIO — HIGH (ref 0.88–1.16)
LDH SERPL L TO P-CCNC: 275 U/L — HIGH (ref 50–242)
MCHC RBC-ENTMCNC: 28.3 PG — SIGNIFICANT CHANGE UP (ref 27–34)
MCHC RBC-ENTMCNC: 31.4 GM/DL — LOW (ref 32–36)
MCV RBC AUTO: 90.2 FL — SIGNIFICANT CHANGE UP (ref 80–100)
METHOD TYPE: SIGNIFICANT CHANGE UP
NRBC # BLD: 0 /100 WBCS — SIGNIFICANT CHANGE UP (ref 0–0)
ORGANISM # SPEC MICROSCOPIC CNT: SIGNIFICANT CHANGE UP
ORGANISM # SPEC MICROSCOPIC CNT: SIGNIFICANT CHANGE UP
PLATELET # BLD AUTO: 350 K/UL — SIGNIFICANT CHANGE UP (ref 150–400)
POTASSIUM SERPL-MCNC: 4.4 MMOL/L — SIGNIFICANT CHANGE UP (ref 3.5–5.3)
POTASSIUM SERPL-SCNC: 4.4 MMOL/L — SIGNIFICANT CHANGE UP (ref 3.5–5.3)
PROCALCITONIN SERPL-MCNC: 0.03 NG/ML — SIGNIFICANT CHANGE UP (ref 0.02–0.1)
PROT SERPL-MCNC: 7.2 GM/DL — SIGNIFICANT CHANGE UP (ref 6–8.3)
PROTHROM AB SERPL-ACNC: 16.2 SEC — HIGH (ref 10.6–13.6)
RBC # BLD: 3.78 M/UL — LOW (ref 3.8–5.2)
RBC # FLD: 13.5 % — SIGNIFICANT CHANGE UP (ref 10.3–14.5)
SODIUM SERPL-SCNC: 138 MMOL/L — SIGNIFICANT CHANGE UP (ref 135–145)
SPECIMEN SOURCE: SIGNIFICANT CHANGE UP
WBC # BLD: 5.49 K/UL — SIGNIFICANT CHANGE UP (ref 3.8–10.5)
WBC # FLD AUTO: 5.49 K/UL — SIGNIFICANT CHANGE UP (ref 3.8–10.5)

## 2021-07-06 PROCEDURE — 99232 SBSQ HOSP IP/OBS MODERATE 35: CPT

## 2021-07-06 RX ADMIN — Medication 6 MILLIGRAM(S): at 05:32

## 2021-07-06 RX ADMIN — PANTOPRAZOLE SODIUM 40 MILLIGRAM(S): 20 TABLET, DELAYED RELEASE ORAL at 05:32

## 2021-07-06 RX ADMIN — REMDESIVIR 500 MILLIGRAM(S): 5 INJECTION INTRAVENOUS at 03:22

## 2021-07-06 RX ADMIN — ENOXAPARIN SODIUM 40 MILLIGRAM(S): 100 INJECTION SUBCUTANEOUS at 05:31

## 2021-07-06 RX ADMIN — ENOXAPARIN SODIUM 40 MILLIGRAM(S): 100 INJECTION SUBCUTANEOUS at 17:18

## 2021-07-06 NOTE — PROGRESS NOTE ADULT - SUBJECTIVE AND OBJECTIVE BOX
Patient is a 40y old  Female who presents with a chief complaint of COVID (05 Jul 2021 14:44)      HPI:  Patient is a 40F with no reported PMH who presents to the ED for dyspnea.  Patient admitted for evaluation for COVID19.  Tested positive for COVID on 6/20, quarantined in [place] for 7 days and returned to [facility].  After she returned from [place], symptoms continued with a cough and has progressed to worsening dyspnea over the last few days.  Symptoms worse with activity.  Patient denies hx of fever, chills, nausea, vomiting, or diarrhea.  Patient spoke to her PMDF today who recommended she come to the ED.  Nonsmoker, allergies as per chart. SpO2 as low as 91 on room air.  Vitals stable, labs benign.  Will admit to med surg. (03 Jul 2021 06:23)    OPPQQRST    ROS:    PAST MEDICAL & SURGICAL HISTORY:  No pertinent past medical history      MEDICATIONS  (STANDING):  dexAMETHasone  Injectable 6 milliGRAM(s) IV Push daily  enoxaparin Injectable 40 milliGRAM(s) SubCutaneous every 12 hours  pantoprazole    Tablet 40 milliGRAM(s) Oral before breakfast  remdesivir  IVPB   IV Intermittent   remdesivir  IVPB 100 milliGRAM(s) IV Intermittent every 24 hours    MEDICATIONS  (PRN):  acetaminophen   Tablet .. 650 milliGRAM(s) Oral every 6 hours PRN Moderate Pain (4 - 6)      EXAM:  Vital Signs Last 24 Hrs  T(C): 36.4 (06 Jul 2021 00:01), Max: 36.4 (05 Jul 2021 00:06)  T(F): 97.5 (06 Jul 2021 00:01), Max: 97.6 (05 Jul 2021 05:32)  HR: 57 (06 Jul 2021 00:01) (50 - 76)  BP: 112/75 (06 Jul 2021 00:01) (101/71 - 122/86)  BP(mean): --  RR: 18 (06 Jul 2021 00:01) (18 - 21)  SpO2: 98% (06 Jul 2021 00:01) (98% - 100%)    07-04 @ 07:01  -  07-05 @ 07:00  --------------------------------------------------------  IN: 420 mL / OUT: 0 mL / NET: 420 mL    07-05 @ 07:01  -  07-06 @ 00:05  --------------------------------------------------------  IN: 240 mL / OUT: 0 mL / NET: 240 mL        PHYSICAL EXAM:  Constitutional: awake  Neck: supple  Respiratory: bilaterally clear to auscultation, no wheezing, no rhonchi, no crackles, no decreased air entry  Cardiovascular: s1s2, rrr, no murmurs.   Gastrointestinal: soft, non tender, +bowel sounds, no rebound, no guarding.   Extremities: no edema.   Neurological: alert and oriented to person, date and place.   Skin: intact no rash, warm to touch.   Musculoskeletal: moves all 4 extremities  Psychiatric: appropriate affect.     LABS:    PT/INR - ( 05 Jul 2021 07:42 )   PT: 15.6 sec;   INR: 1.36 ratio           LIVER FUNCTIONS - ( 05 Jul 2021 07:42 )  Alb: 2.9 g/dL / Pro: 7.2 gm/dL / ALK PHOS: 48 U/L / ALT: 47 U/L / AST: 11 U/L / GGT: x                                 10.6   6.92  )-----------( 327      ( 05 Jul 2021 07:42 )             33.1     07-05    141  |  107  |  12  ----------------------------<  115<H>  4.1   |  29  |  0.64    Ca    8.5      05 Jul 2021 07:42  Phos  3.2     07-05  Mg     2.5     07-05    TPro  7.2  /  Alb  2.9<L>  /  TBili  0.2  /  DBili  .09  /  AST  11<L>  /  ALT  47  /  AlkPhos  48  07-05               Patient is a 40y old  Female who presents with a chief complaint of COVID (05 Jul 2021 14:44)    HPI:  Patient is a 40F with no reported PMH who presents to the ED for dyspnea.  Patient admitted for evaluation for COVID19.  Tested positive for COVID on 6/20, quarantined in [place] for 7 days and returned to [facility].  After she returned from [place], symptoms continued with a cough and has progressed to worsening dyspnea over the last few days.  Symptoms worse with activity.  Patient denies hx of fever, chills, nausea, vomiting, or diarrhea.  Patient spoke to her PMDF today who recommended she come to the ED.  Nonsmoker, allergies as per chart. SpO2 as low as 91 on room air.  Vitals stable, labs benign.  Will admit to med surg. (03 Jul 2021 06:23)    Today: Pt is on 3L nasal canula. No phlegm No fevers. She has a dry cough.     PAST MEDICAL & SURGICAL HISTORY:  No pertinent past medical history      MEDICATIONS  (STANDING):  dexAMETHasone  Injectable 6 milliGRAM(s) IV Push daily  enoxaparin Injectable 40 milliGRAM(s) SubCutaneous every 12 hours  pantoprazole    Tablet 40 milliGRAM(s) Oral before breakfast  remdesivir  IVPB   IV Intermittent   remdesivir  IVPB 100 milliGRAM(s) IV Intermittent every 24 hours    MEDICATIONS  (PRN):  acetaminophen   Tablet .. 650 milliGRAM(s) Oral every 6 hours PRN Moderate Pain (4 - 6)      EXAM:  Vital Signs Last 24 Hrs  T(C): 36.4 (06 Jul 2021 00:01), Max: 36.4 (05 Jul 2021 00:06)  T(F): 97.5 (06 Jul 2021 00:01), Max: 97.6 (05 Jul 2021 05:32)  HR: 57 (06 Jul 2021 00:01) (50 - 76)  BP: 112/75 (06 Jul 2021 00:01) (101/71 - 122/86)  BP(mean): --  RR: 18 (06 Jul 2021 00:01) (18 - 21)  SpO2: 98% (06 Jul 2021 00:01) (98% - 100%)    07-04 @ 07:01  -  07-05 @ 07:00  --------------------------------------------------------  IN: 420 mL / OUT: 0 mL / NET: 420 mL    07-05 @ 07:01  -  07-06 @ 00:05  --------------------------------------------------------  IN: 240 mL / OUT: 0 mL / NET: 240 mL        PHYSICAL EXAM:  Constitutional: awake, on nasal canula.   Neck: supple  Respiratory: decreased breath sounds bilaterally. No wheezing, no rhonchi, no crackles.   Cardiovascular: s1s2, rrr, no murmurs.   Neurological: alert and oriented to person, date and place.   Skin: intact no rash, warm to touch.   Musculoskeletal: moves all 4 extremities  Psychiatric: appropriate affect.     LABS:  < from: CT Angio Chest PE Protocol w/ IV Cont (07.03.21 @ 06:35) >  IMPRESSION:  No pulmonary embolus.  Mild basilar predominant reticular and groundglass opacities.  < end of copied text >    PT/INR - ( 05 Jul 2021 07:42 )   PT: 15.6 sec;   INR: 1.36 ratio      LIVER FUNCTIONS - ( 05 Jul 2021 07:42 )  Alb: 2.9 g/dL / Pro: 7.2 gm/dL / ALK PHOS: 48 U/L / ALT: 47 U/L / AST: 11 U/L / GGT: x                               10.6   6.92  )-----------( 327      ( 05 Jul 2021 07:42 )             33.1     07-05    141  |  107  |  12  ----------------------------<  115<H>  4.1   |  29  |  0.64    Ca    8.5      05 Jul 2021 07:42  Phos  3.2     07-05  Mg     2.5     07-05    TPro  7.2  /  Alb  2.9<L>  /  TBili  0.2  /  DBili  .09  /  AST  11<L>  /  ALT  47  /  AlkPhos  48  07-05               Patient seen and examined on July 4th 2021.     Patient is a 40y old  Female who presents with a chief complaint of COVID (05 Jul 2021 14:44)    HPI:  Patient is a 40F with no reported PMH who presents to the ED for dyspnea.  Patient admitted for evaluation for COVID19.  Tested positive for COVID on 6/20, quarantined in [place] for 7 days and returned to [facility].  After she returned from [place], symptoms continued with a cough and has progressed to worsening dyspnea over the last few days.  Symptoms worse with activity.  Patient denies hx of fever, chills, nausea, vomiting, or diarrhea.  Patient spoke to her PMDF today who recommended she come to the ED.  Nonsmoker, allergies as per chart. SpO2 as low as 91 on room air.  Vitals stable, labs benign.  Will admit to med surg. (03 Jul 2021 06:23)    Today: Pt is on 3L nasal canula. No phlegm No fevers. She has a dry cough.     PAST MEDICAL & SURGICAL HISTORY:  No pertinent past medical history      MEDICATIONS  (STANDING):  dexAMETHasone  Injectable 6 milliGRAM(s) IV Push daily  enoxaparin Injectable 40 milliGRAM(s) SubCutaneous every 12 hours  pantoprazole    Tablet 40 milliGRAM(s) Oral before breakfast  remdesivir  IVPB   IV Intermittent   remdesivir  IVPB 100 milliGRAM(s) IV Intermittent every 24 hours    MEDICATIONS  (PRN):  acetaminophen   Tablet .. 650 milliGRAM(s) Oral every 6 hours PRN Moderate Pain (4 - 6)      EXAM:  Vital Signs Last 24 Hrs  T(C): 36.4 (06 Jul 2021 00:01), Max: 36.4 (05 Jul 2021 00:06)  T(F): 97.5 (06 Jul 2021 00:01), Max: 97.6 (05 Jul 2021 05:32)  HR: 57 (06 Jul 2021 00:01) (50 - 76)  BP: 112/75 (06 Jul 2021 00:01) (101/71 - 122/86)  BP(mean): --  RR: 18 (06 Jul 2021 00:01) (18 - 21)  SpO2: 98% (06 Jul 2021 00:01) (98% - 100%)    07-04 @ 07:01  -  07-05 @ 07:00  --------------------------------------------------------  IN: 420 mL / OUT: 0 mL / NET: 420 mL    07-05 @ 07:01  -  07-06 @ 00:05  --------------------------------------------------------  IN: 240 mL / OUT: 0 mL / NET: 240 mL        PHYSICAL EXAM:  Constitutional: awake, on nasal canula.   Neck: supple  Respiratory: decreased breath sounds bilaterally. No wheezing, no rhonchi, no crackles.   Cardiovascular: s1s2, rrr, no murmurs.   Neurological: alert and oriented to person, date and place.   Skin: intact no rash, warm to touch.   Musculoskeletal: moves all 4 extremities  Psychiatric: appropriate affect.     LABS:  < from: CT Angio Chest PE Protocol w/ IV Cont (07.03.21 @ 06:35) >  IMPRESSION:  No pulmonary embolus.  Mild basilar predominant reticular and groundglass opacities.  < end of copied text >    PT/INR - ( 05 Jul 2021 07:42 )   PT: 15.6 sec;   INR: 1.36 ratio      LIVER FUNCTIONS - ( 05 Jul 2021 07:42 )  Alb: 2.9 g/dL / Pro: 7.2 gm/dL / ALK PHOS: 48 U/L / ALT: 47 U/L / AST: 11 U/L / GGT: x                               10.6   6.92  )-----------( 327      ( 05 Jul 2021 07:42 )             33.1     07-05    141  |  107  |  12  ----------------------------<  115<H>  4.1   |  29  |  0.64    Ca    8.5      05 Jul 2021 07:42  Phos  3.2     07-05  Mg     2.5     07-05    TPro  7.2  /  Alb  2.9<L>  /  TBili  0.2  /  DBili  .09  /  AST  11<L>  /  ALT  47  /  AlkPhos  48  07-05               Patient seen and examined on July 5th 2021.     Patient is a 40y old  Female who presents with a chief complaint of COVID (05 Jul 2021 14:44)    HPI:  Patient is a 40F with no reported PMH who presents to the ED for dyspnea.  Patient admitted for evaluation for COVID19.  Tested positive for COVID on 6/20, quarantined in [place] for 7 days and returned to [facility].  After she returned from [place], symptoms continued with a cough and has progressed to worsening dyspnea over the last few days.  Symptoms worse with activity.  Patient denies hx of fever, chills, nausea, vomiting, or diarrhea.  Patient spoke to her PMDF today who recommended she come to the ED.  Nonsmoker, allergies as per chart. SpO2 as low as 91 on room air.  Vitals stable, labs benign.  Will admit to med surg. (03 Jul 2021 06:23)    Today: Pt is on 3L nasal canula. No phlegm No fevers. She has a dry cough.     PAST MEDICAL & SURGICAL HISTORY:  No pertinent past medical history      MEDICATIONS  (STANDING):  dexAMETHasone  Injectable 6 milliGRAM(s) IV Push daily  enoxaparin Injectable 40 milliGRAM(s) SubCutaneous every 12 hours  pantoprazole    Tablet 40 milliGRAM(s) Oral before breakfast  remdesivir  IVPB   IV Intermittent   remdesivir  IVPB 100 milliGRAM(s) IV Intermittent every 24 hours    MEDICATIONS  (PRN):  acetaminophen   Tablet .. 650 milliGRAM(s) Oral every 6 hours PRN Moderate Pain (4 - 6)      EXAM:  Vital Signs Last 24 Hrs  T(C): 36.4 (06 Jul 2021 00:01), Max: 36.4 (05 Jul 2021 00:06)  T(F): 97.5 (06 Jul 2021 00:01), Max: 97.6 (05 Jul 2021 05:32)  HR: 57 (06 Jul 2021 00:01) (50 - 76)  BP: 112/75 (06 Jul 2021 00:01) (101/71 - 122/86)  BP(mean): --  RR: 18 (06 Jul 2021 00:01) (18 - 21)  SpO2: 98% (06 Jul 2021 00:01) (98% - 100%)    07-04 @ 07:01  -  07-05 @ 07:00  --------------------------------------------------------  IN: 420 mL / OUT: 0 mL / NET: 420 mL    07-05 @ 07:01  -  07-06 @ 00:05  --------------------------------------------------------  IN: 240 mL / OUT: 0 mL / NET: 240 mL        PHYSICAL EXAM:  Constitutional: awake, on nasal canula.   Neck: supple  Respiratory: decreased breath sounds bilaterally. No wheezing, no rhonchi, no crackles.   Cardiovascular: s1s2, rrr, no murmurs.   Neurological: alert and oriented to person, date and place.   Skin: intact no rash, warm to touch.   Musculoskeletal: moves all 4 extremities  Psychiatric: appropriate affect.     LABS:  < from: CT Angio Chest PE Protocol w/ IV Cont (07.03.21 @ 06:35) >  IMPRESSION:  No pulmonary embolus.  Mild basilar predominant reticular and groundglass opacities.  < end of copied text >    PT/INR - ( 05 Jul 2021 07:42 )   PT: 15.6 sec;   INR: 1.36 ratio      LIVER FUNCTIONS - ( 05 Jul 2021 07:42 )  Alb: 2.9 g/dL / Pro: 7.2 gm/dL / ALK PHOS: 48 U/L / ALT: 47 U/L / AST: 11 U/L / GGT: x                               10.6   6.92  )-----------( 327      ( 05 Jul 2021 07:42 )             33.1     07-05    141  |  107  |  12  ----------------------------<  115<H>  4.1   |  29  |  0.64    Ca    8.5      05 Jul 2021 07:42  Phos  3.2     07-05  Mg     2.5     07-05    TPro  7.2  /  Alb  2.9<L>  /  TBili  0.2  /  DBili  .09  /  AST  11<L>  /  ALT  47  /  AlkPhos  48  07-05

## 2021-07-06 NOTE — PROGRESS NOTE ADULT - ASSESSMENT
40F with no reported PMH admitted with COVID-19.     -Acute respiratory failure sec to COVID-19   CT angio - GGO, no PE   elevated inflamm markers   Tylenol PRN fever  c/w dexamethasone and remdesivir as patient requiring supplemental oxygen.     Precautions per protocol, ideally airborne and contact in negative pressure room. Supplemental oxygen as required to maintain adequate saturation. Vigilance for secondary infection and other superimposed events. Monitor inflammatory markers and lab data. DVT prophylaxis per protocol. Prognosis guarded. Encourage use of incentive spirometer.  proning as tolerated.

## 2021-07-06 NOTE — PROGRESS NOTE ADULT - SUBJECTIVE AND OBJECTIVE BOX
Patient is a 40y old  Female who presents with a chief complaint of COVID (06 Jul 2021 00:04)    HPI:  Patient is a 40F with no reported PMH who presents to the ED for dyspnea.  Patient admitted for evaluation for COVID19.  Tested positive for COVID on 6/20, quarantined in [place] for 7 days and returned to [facility].  After she returned from [place], symptoms continued with a cough and has progressed to worsening dyspnea over the last few days.  Symptoms worse with activity.  Patient denies hx of fever, chills, nausea, vomiting, or diarrhea.  Patient spoke to her PMDF today who recommended she come to the ED.  Nonsmoker, allergies as per chart. SpO2 as low as 91 on room air.  Vitals stable, labs benign.  Will admit to med surg. (03 Jul 2021 06:23)    Today: Pt noted to desaturate to 89% on ambulation on room air. Pt placed on 2L nasal canula. Pt denies any other complaints.     PAST MEDICAL & SURGICAL HISTORY:  No pertinent past medical history      MEDICATIONS  (STANDING):  dexAMETHasone  Injectable 6 milliGRAM(s) IV Push daily  enoxaparin Injectable 40 milliGRAM(s) SubCutaneous every 12 hours  pantoprazole    Tablet 40 milliGRAM(s) Oral before breakfast  remdesivir  IVPB   IV Intermittent   remdesivir  IVPB 100 milliGRAM(s) IV Intermittent every 24 hours    MEDICATIONS  (PRN):  acetaminophen   Tablet .. 650 milliGRAM(s) Oral every 6 hours PRN Moderate Pain (4 - 6)      EXAM:  Vital Signs Last 24 Hrs  T(C): 36.3 (06 Jul 2021 11:27), Max: 36.4 (06 Jul 2021 00:01)  T(F): 97.4 (06 Jul 2021 11:27), Max: 97.5 (06 Jul 2021 00:01)  HR: 54 (06 Jul 2021 11:27) (54 - 73)  BP: 93/59 (06 Jul 2021 11:27) (93/59 - 112/75)  BP(mean): --  RR: 19 (06 Jul 2021 13:07) (18 - 19)  SpO2: 89% (06 Jul 2021 13:07) (89% - 99%)    07-05 @ 07:01  -  07-06 @ 07:00  --------------------------------------------------------  IN: 240 mL / OUT: 0 mL / NET: 240 mL        PHYSICAL EXAM:  Constitutional: awake on nasal canula.   Neck: supple  Respiratory: bilateral fine crackles in lower bases.   Cardiovascular: s1s2  Gastrointestinal: soft, non tender, +bowel sounds, no rebound, no guarding.   Extremities: no edema.   Neurological: alert and oriented to person, date and place.   Skin: intact no rash, warm to touch.   Musculoskeletal: moves all 4 extremities  Psychiatric: appropriate affect.     LABS:    PT/INR - ( 06 Jul 2021 08:27 )   PT: 16.2 sec;   INR: 1.42 ratio           LIVER FUNCTIONS - ( 06 Jul 2021 08:27 )  Alb: 2.7 g/dL / Pro: 7.2 gm/dL / ALK PHOS: 51 U/L / ALT: 46 U/L / AST: 6 U/L / GGT: x                                 10.7   5.49  )-----------( 350      ( 06 Jul 2021 08:27 )             34.1     07-06    138  |  105  |  14  ----------------------------<  114<H>  4.4   |  29  |  0.82    Ca    8.3<L>      06 Jul 2021 08:27  Phos  3.2     07-05  Mg     2.5     07-05    TPro  7.2  /  Alb  2.7<L>  /  TBili  0.2  /  DBili  .08  /  AST  6<L>  /  ALT  46  /  AlkPhos  51  07-06

## 2021-07-06 NOTE — PROGRESS NOTE ADULT - ASSESSMENT
40F with no reported PMH admitted with COVID-19.     -Acute respiratory failure sec to COVID-19.  CTA chest significant for ground glass opacities. Pt still requiring oxygen via nasal canula. Unable to tolerate room air on ambulation. SW notified for possible home o2 on discharge. Pt to complete 4 doses of Remdesivir tomorrow. Will continue with dexamethasone.   Cw precautions per protocol, ideally airborne and contact in negative pressure room.   Vigilance for secondary infection and other superimposed events.  Monitor inflammatory markers and lab data.    - DVT prophylaxis per protocol.   Prognosis guarded. Encourage use of incentive spirometer.  proning as tolerated.    SW consult for possible home o2 on discharge. Re-evaluate o2 in am.

## 2021-07-07 LAB
ALBUMIN SERPL ELPH-MCNC: 2.9 G/DL — LOW (ref 3.3–5)
ALP SERPL-CCNC: 55 U/L — SIGNIFICANT CHANGE UP (ref 40–120)
ALT FLD-CCNC: 41 U/L — SIGNIFICANT CHANGE UP (ref 12–78)
AST SERPL-CCNC: 9 U/L — LOW (ref 15–37)
BILIRUB DIRECT SERPL-MCNC: 0.24 MG/DL — HIGH (ref 0.05–0.2)
BILIRUB INDIRECT FLD-MCNC: 0 MG/DL — LOW (ref 0.2–1)
BILIRUB SERPL-MCNC: 0.2 MG/DL — SIGNIFICANT CHANGE UP (ref 0.2–1.2)
CREAT SERPL-MCNC: 0.99 MG/DL — SIGNIFICANT CHANGE UP (ref 0.5–1.3)
D DIMER BLD IA.RAPID-MCNC: 390 NG/ML DDU — HIGH
INR BLD: 1.31 RATIO — HIGH (ref 0.88–1.16)
PROT SERPL-MCNC: 7.5 GM/DL — SIGNIFICANT CHANGE UP (ref 6–8.3)
PROTHROM AB SERPL-ACNC: 15 SEC — HIGH (ref 10.6–13.6)

## 2021-07-07 RX ADMIN — PANTOPRAZOLE SODIUM 40 MILLIGRAM(S): 20 TABLET, DELAYED RELEASE ORAL at 05:34

## 2021-07-07 RX ADMIN — REMDESIVIR 500 MILLIGRAM(S): 5 INJECTION INTRAVENOUS at 03:25

## 2021-07-07 RX ADMIN — ENOXAPARIN SODIUM 40 MILLIGRAM(S): 100 INJECTION SUBCUTANEOUS at 17:43

## 2021-07-07 RX ADMIN — ENOXAPARIN SODIUM 40 MILLIGRAM(S): 100 INJECTION SUBCUTANEOUS at 05:34

## 2021-07-07 RX ADMIN — Medication 6 MILLIGRAM(S): at 05:34

## 2021-07-07 NOTE — CHART NOTE - NSCHARTNOTEFT_GEN_A_CORE
Patient seen and examined bedside.   states feels SOB on exertion     Vital Signs Last 24 Hrs  T(C): 36.4 (03 Jul 2021 21:19), Max: 37.2 (03 Jul 2021 04:03)  T(F): 97.5 (03 Jul 2021 21:19), Max: 98.9 (03 Jul 2021 04:03)  HR: 79 (03 Jul 2021 21:19) (79 - 113)  BP: 119/81 (03 Jul 2021 21:19) (105/80 - 130/78)  BP(mean): --  RR: 19 (03 Jul 2021 21:19) (14 - 24)  SpO2: 96% (03 Jul 2021 21:19) (91% - 100%)    GENERAL: NAD well-developed,  not using accessory muscles, speaking in full sentences   HEAD:  Atraumatic, Normocephalic  EYES: EOMI, PERRLA, conjunctiva and sclera clear  ENMT: No tonsillar erythema, exudates, or enlargement; Moist mucous membranes  NECK: Supple, No JVD  NERVOUS SYSTEM:  Alert & Oriented X3, Good concentration; no focal neuro deficits   CHEST/LUNG: CTAB, no r/r/w  HEART: Regular rate and rhythm; No murmurs, rubs, or gallops  ABDOMEN: Soft, Nontender, Nondistended; Bowel sounds present  EXTREMITIES:  2+ Peripheral Pulses b/l, No clubbing, cyanosis, calf tenderness or edema b/l                           12.4   3.93  )-----------( 252      ( 03 Jul 2021 00:29 )             38.0   07-03    135  |  99  |  8   ----------------------------<  108<H>  4.1   |  27  |  0.87    Ca    9.0      03 Jul 2021 00:29    TPro  7.8  /  Alb  2.9<L>  /  TBili  0.4  /  DBili  .15  /  AST  29  /  ALT  69  /  AlkPhos  51  07-03    Assessment and Plan:     COVID-19.  Plan: COVID 19 swabbed in ED - +ve results.  Isolation precautions  Tylenol PRN fever  Will start dexamethasone and remdesivir as patient requiring supplemental oxygen.     Precautions per protocol, ideally airborne and contact in negative pressure room. Supplemental oxygen as required to maintain adequate saturation. Vigilance for secondary infection and other superimposed events. Monitor inflammatory markers and lab data. DVT prophylaxis per protocol. Prognosis guarded. Encourage use of incentive spirometer.  proning as tolerated
Medicine Hospitalist PA    Patient pulse oxygenation was checked on three different modalities.   Resting room air: 93%  Ambulating room air: 90%  Ambulating on 2L of oxygen: 97%

## 2021-07-07 NOTE — PROGRESS NOTE ADULT - ASSESSMENT
40F with no reported PMH admitted with COVID-19.     -Acute respiratory failure sec to COVID-19.  CTA chest significant for ground glass opacities. Pt still requiring oxygen via nasal canula. Unable to tolerate room air on ambulation. SW notified for possible home o2 on discharge. Pt to complete 4 doses of Remdesivir tomorrow. Will continue with dexamethasone.   Cw precautions per protocol, ideally airborne and contact in negative pressure room.   Vigilance for secondary infection and other superimposed events.  Monitor inflammatory markers and lab data.    - DVT prophylaxis per protocol.   Prognosis guarded. Encourage use of incentive spirometer.  proning as tolerated.    SW consult for possible home o2 on discharge.   Patient requiring Home Oxygen.

## 2021-07-07 NOTE — PROGRESS NOTE ADULT - SUBJECTIVE AND OBJECTIVE BOX
Patient is a 40y old  Female who presents with a chief complaint of COVID (06 Jul 2021 00:04)    HPI:  Patient is a 40F with no reported PMH who presents to the ED for dyspnea.  Patient admitted for evaluation for COVID19.  Tested positive for COVID on 6/20, quarantined in [place] for 7 days and returned to [facility].  After she returned from [place], symptoms continued with a cough and has progressed to worsening dyspnea over the last few days.  Symptoms worse with activity.  Patient denies hx of fever, chills, nausea, vomiting, or diarrhea.  Patient spoke to her PMDF today who recommended she come to the ED.  Nonsmoker, allergies as per chart. SpO2 as low as 91 on room air.  Vitals stable, labs benign.  Will admit to med surg. (03 Jul 2021 06:23)    Today: Pt noted to desaturate to 89% on ambulation on room air. Pt placed on 2L nasal canula. Pt denies any other complaints.     PAST MEDICAL & SURGICAL HISTORY:  No pertinent past medical history    T(C): 36.6 (07-07-21 @ 16:00), Max: 36.6 (07-07-21 @ 16:00)  HR: 53 (07-07-21 @ 16:00) (53 - 53)  BP: 110/77 (07-07-21 @ 16:00) (110/77 - 110/77)  RR: 18 (07-07-21 @ 16:00) (18 - 19)  SpO2: 94% (07-07-21 @ 16:00) (90% - 97%)      MEDICATIONS  (STANDING):  dexAMETHasone  Injectable 6 milliGRAM(s) IV Push daily  enoxaparin Injectable 40 milliGRAM(s) SubCutaneous every 12 hours  pantoprazole    Tablet 40 milliGRAM(s) Oral before breakfast    MEDICATIONS  (PRN):  acetaminophen   Tablet .. 650 milliGRAM(s) Oral every 6 hours PRN Moderate Pain (4 - 6)        PHYSICAL EXAM:  Constitutional: awake on nasal canula.   Neck: supple  Respiratory: bilateral fine crackles in lower bases.   Cardiovascular: s1s2  Gastrointestinal: soft, non tender, +bowel sounds, no rebound, no guarding.   Extremities: no edema.   Neurological: alert and oriented to person, date and place.   Skin: intact no rash, warm to touch.   Musculoskeletal: moves all 4 extremities  Psychiatric: appropriate affect.                           10.7   5.49  )-----------( 350      ( 06 Jul 2021 08:27 )             34.1           LIVER FUNCTIONS - ( 07 Jul 2021 09:14 )  Alb: 2.9 g/dL / Pro: 7.5 gm/dL / ALK PHOS: 55 U/L / ALT: 41 U/L / AST: 9 U/L / GGT: x           PT/INR - ( 07 Jul 2021 09:14 )   PT: 15.0 sec;   INR: 1.31 ratio           --|--|--<--  --|--|0.99  --,--,--  07-07 @ 09:14      CAPILLARY BLOOD GLUCOSE

## 2021-07-08 ENCOUNTER — TRANSCRIPTION ENCOUNTER (OUTPATIENT)
Age: 41
End: 2021-07-08

## 2021-07-08 VITALS — OXYGEN SATURATION: 95 %

## 2021-07-08 LAB
ALBUMIN SERPL ELPH-MCNC: 2.9 G/DL — LOW (ref 3.3–5)
ALP SERPL-CCNC: 60 U/L — SIGNIFICANT CHANGE UP (ref 40–120)
ALT FLD-CCNC: 38 U/L — SIGNIFICANT CHANGE UP (ref 12–78)
AST SERPL-CCNC: 11 U/L — LOW (ref 15–37)
BILIRUB DIRECT SERPL-MCNC: 0.09 MG/DL — SIGNIFICANT CHANGE UP (ref 0.05–0.2)
BILIRUB INDIRECT FLD-MCNC: 0.1 MG/DL — LOW (ref 0.2–1)
BILIRUB SERPL-MCNC: 0.2 MG/DL — SIGNIFICANT CHANGE UP (ref 0.2–1.2)
CREAT SERPL-MCNC: 0.74 MG/DL — SIGNIFICANT CHANGE UP (ref 0.5–1.3)
CULTURE RESULTS: SIGNIFICANT CHANGE UP
CULTURE RESULTS: SIGNIFICANT CHANGE UP
INR BLD: 1.28 RATIO — HIGH (ref 0.88–1.16)
PROT SERPL-MCNC: 7.1 GM/DL — SIGNIFICANT CHANGE UP (ref 6–8.3)
PROTHROM AB SERPL-ACNC: 14.7 SEC — HIGH (ref 10.6–13.6)
SPECIMEN SOURCE: SIGNIFICANT CHANGE UP
SPECIMEN SOURCE: SIGNIFICANT CHANGE UP

## 2021-07-08 RX ORDER — RIVAROXABAN 15 MG-20MG
1 KIT ORAL
Qty: 30 | Refills: 0
Start: 2021-07-08 | End: 2021-08-06

## 2021-07-08 RX ORDER — PANTOPRAZOLE SODIUM 20 MG/1
1 TABLET, DELAYED RELEASE ORAL
Qty: 30 | Refills: 0
Start: 2021-07-08 | End: 2021-08-06

## 2021-07-08 RX ADMIN — PANTOPRAZOLE SODIUM 40 MILLIGRAM(S): 20 TABLET, DELAYED RELEASE ORAL at 05:34

## 2021-07-08 RX ADMIN — ENOXAPARIN SODIUM 40 MILLIGRAM(S): 100 INJECTION SUBCUTANEOUS at 05:34

## 2021-07-08 RX ADMIN — Medication 6 MILLIGRAM(S): at 05:34

## 2021-07-08 NOTE — PROGRESS NOTE ADULT - ASSESSMENT
40F with no reported PMH admitted with COVID-19.     -Acute respiratory failure sec to COVID-19.  CTA chest significant for ground glass opacities. Pt still requiring oxygen via nasal canula. Unable to tolerate room air on ambulation. SW notified for possible home o2 on discharge. Pt to complete 4 doses of Remdesivir tomorrow. Will continue with dexamethasone.   Cw precautions per protocol, ideally airborne and contact in negative pressure room.   Vigilance for secondary infection and other superimposed events.  Monitor inflammatory markers and lab data.    - DVT prophylaxis per protocol.   Prognosis guarded. Encourage use of incentive spirometer.  proning as tolerated.    SW consult for possible home o2 on discharge.   Patient requiring Home Oxygen.   40F with no reported PMH admitted with COVID-19.     -Acute respiratory failure sec to COVID-19.  CTA chest significant for ground glass opacities. Pt still requiring oxygen via nasal canula. Unable to tolerate room air on ambulation. SW notified for possible home o2 on discharge. Pt to complete 4 doses of Remdesivir tomorrow. Will continue with dexamethasone.   Cw precautions per protocol, ideally airborne and contact in negative pressure room.   Vigilance for secondary infection and other superimposed events.  Monitor inflammatory markers and lab data.    - DVT prophylaxis per protocol.   Prognosis guarded. Encourage use of incentive spirometer.  proning as tolerated.    Patient sating well on RA.   D/C planning on Xarelto.

## 2021-07-08 NOTE — PROGRESS NOTE ADULT - SUBJECTIVE AND OBJECTIVE BOX
Patient is a 40y old  Female who presents with a chief complaint of COVID (06 Jul 2021 00:04)    HPI:  Patient is a 40F with no reported PMH who presents to the ED for dyspnea.  Patient admitted for evaluation for COVID19.  Tested positive for COVID on 6/20, quarantined in [place] for 7 days and returned to [facility].  After she returned from [place], symptoms continued with a cough and has progressed to worsening dyspnea over the last few days.  Symptoms worse with activity.  Patient denies hx of fever, chills, nausea, vomiting, or diarrhea.  Patient spoke to her PMDF today who recommended she come to the ED.  Nonsmoker, allergies as per chart. SpO2 as low as 91 on room air.  Vitals stable, labs benign.  Will admit to med surg. (03 Jul 2021 06:23)    Today: Pt noted to desaturate to 89% on ambulation on room air. Pt placed on 2L nasal canula. Pt denies any other complaints.     PAST MEDICAL & SURGICAL HISTORY:  No pertinent past medical history    MEDICATIONS  (STANDING):  dexAMETHasone  Injectable 6 milliGRAM(s) IV Push daily  enoxaparin Injectable 40 milliGRAM(s) SubCutaneous every 12 hours  pantoprazole    Tablet 40 milliGRAM(s) Oral before breakfast    MEDICATIONS  (PRN):  acetaminophen   Tablet .. 650 milliGRAM(s) Oral every 6 hours PRN Moderate Pain (4 - 6)  PHYSICAL EXAM:  Constitutional: awake on nasal canula.   Neck: supple  Respiratory: bilateral fine crackles in lower bases.   Cardiovascular: s1s2  Gastrointestinal: soft, non tender, +bowel sounds, no rebound, no guarding.   Extremities: no edema.   Neurological: alert and oriented to person, date and place.   Skin: intact no rash, warm to touch.   Musculoskeletal: moves all 4 extremities  Psychiatric: appropriate affect.                     LIVER FUNCTIONS - ( 08 Jul 2021 07:24 )  Alb: 2.9 g/dL / Pro: 7.1 gm/dL / ALK PHOS: 60 U/L / ALT: 38 U/L / AST: 11 U/L / GGT: x           PT/INR - ( 08 Jul 2021 07:24 )   PT: 14.7 sec;   INR: 1.28 ratio           --|--|--<--  --|--|0.74  --,--,--  07-08 @ 07:24    CAPILLARY BLOOD GLUCOSE

## 2021-07-08 NOTE — PROGRESS NOTE ADULT - NUTRITIONAL ASSESSMENT
This patient has been assessed with a concern for Malnutrition and has been determined to have a diagnosis/diagnoses of Severe protein-calorie malnutrition.    This patient is being managed with:   Diet Regular-  Supplement Feeding Modality:  Oral  Ensure Enlive Cans or Servings Per Day:  1       Frequency:  Daily  Entered: Jul 5 2021  2:57PM    
This patient has been assessed with a concern for Malnutrition and has been determined to have a diagnosis/diagnoses of Severe protein-calorie malnutrition.    This patient is being managed with:   Diet Regular-  Supplement Feeding Modality:  Oral  Ensure Enlive Cans or Servings Per Day:  1       Frequency:  Daily  Entered: Jul 5 2021  2:57PM    
This patient has been assessed with a concern for Malnutrition and has been determined to have a diagnosis/diagnoses of Severe protein-calorie malnutrition.    This patient is being managed with:   Diet Regular-  Supplement Feeding Modality:  Oral  Ensure Enlive Cans or Servings Per Day:  1       Frequency:  Daily  Entered: Jul 5 2021  2:57PM    Diet Regular-  Entered: Jul  3 2021  8:56AM    The following pending diet order is being considered for treatment of Severe protein-calorie malnutrition:null
This patient has been assessed with a concern for Malnutrition and has been determined to have a diagnosis/diagnoses of Severe protein-calorie malnutrition.    This patient is being managed with:   Diet Regular-  Supplement Feeding Modality:  Oral  Ensure Enlive Cans or Servings Per Day:  1       Frequency:  Daily  Entered: Jul 5 2021  2:57PM    Diet Regular-  Entered: Jul  3 2021  8:56AM    The following pending diet order is being considered for treatment of Severe protein-calorie malnutrition:null
ambulatory

## 2021-07-08 NOTE — DISCHARGE NOTE PROVIDER - NSDCCPCAREPLAN_GEN_ALL_CORE_FT
PRINCIPAL DISCHARGE DIAGNOSIS  Diagnosis: COVID-19  Assessment and Plan of Treatment: 40F with no reported PMH admitted with COVID-19.   -Acute respiratory failure sec to COVID-19.  CTA chest significant for ground glass opacities. Pt was requiring oxygen via nasal canula. But now doing well on room air and with ambulation O2 saturation stable.   C/w precautions per protocol, ideally airborne and contact in negative pressure room.   Vigilance for secondary infection and other superimposed events.  Monitor inflammatory markers and lab data.  - DVT prophylaxis per protocol.   Prognosis guarded. Encourage use of incentive spirometer.  proning as tolerated.  SW consult for possible home o2 on discharge.   Patient now doing well on room air. Stable to d/c home, on xarelto 10mg x 30 days

## 2021-07-08 NOTE — DISCHARGE NOTE PROVIDER - DETAILS OF MALNUTRITION DIAGNOSIS/DIAGNOSES
This patient has been assessed with a concern for Malnutrition and was treated during this hospitalization for the following Nutrition diagnosis/diagnoses:     -  07/05/2021: Severe protein-calorie malnutrition

## 2021-07-08 NOTE — DISCHARGE NOTE PROVIDER - NSDCMRMEDTOKEN_GEN_ALL_CORE_FT
pantoprazole 40 mg oral delayed release tablet: 1 tab(s) orally once a day (before a meal)  Xarelto 10 mg oral tablet: 1 tab(s) orally once a day

## 2021-07-08 NOTE — DISCHARGE NOTE NURSING/CASE MANAGEMENT/SOCIAL WORK - PATIENT PORTAL LINK FT
You can access the FollowMyHealth Patient Portal offered by [facility] by registering at the following website: [email or web address]. By joining [employer]’s FollowMyHealth portal, you will also be able to view your health information using other applications (apps) compatible with our system.

## 2021-07-08 NOTE — DISCHARGE NOTE PROVIDER - HOSPITAL COURSE
40F with no reported PMH admitted with COVID-19.     -Acute respiratory failure sec to COVID-19.  CTA chest significant for ground glass opacities. Pt was requiring oxygen via nasal canula. But now doing well on room air and with ambulation O2 saturation stable.   C/w precautions per protocol, ideally airborne and contact in negative pressure room.   Vigilance for secondary infection and other superimposed events.  Monitor inflammatory markers and lab data.    - DVT prophylaxis per protocol.   Prognosis guarded. Encourage use of incentive spirometer.  proning as tolerated.    SW consult for possible home o2 on discharge.   Patient now doing well on room air. Stable to d/c home, on xarelto 10mg x 30 days

## 2021-07-09 ENCOUNTER — TRANSCRIPTION ENCOUNTER (OUTPATIENT)
Age: 41
End: 2021-07-09

## 2021-07-15 PROBLEM — Z78.9 OTHER SPECIFIED HEALTH STATUS: Chronic | Status: ACTIVE | Noted: 2021-07-03

## 2021-07-16 DIAGNOSIS — J96.00 ACUTE RESPIRATORY FAILURE, UNSPECIFIED WHETHER WITH HYPOXIA OR HYPERCAPNIA: ICD-10-CM

## 2021-07-16 DIAGNOSIS — Z88.6 ALLERGY STATUS TO ANALGESIC AGENT: ICD-10-CM

## 2021-07-16 DIAGNOSIS — E43 UNSPECIFIED SEVERE PROTEIN-CALORIE MALNUTRITION: ICD-10-CM

## 2021-07-16 DIAGNOSIS — U07.1 COVID-19: ICD-10-CM

## 2021-07-16 DIAGNOSIS — J12.82 PNEUMONIA DUE TO CORONAVIRUS DISEASE 2019: ICD-10-CM

## 2021-07-16 DIAGNOSIS — Z88.0 ALLERGY STATUS TO PENICILLIN: ICD-10-CM

## 2021-07-21 ENCOUNTER — TRANSCRIPTION ENCOUNTER (OUTPATIENT)
Age: 41
End: 2021-07-21

## 2021-08-24 ENCOUNTER — APPOINTMENT (OUTPATIENT)
Dept: PULMONOLOGY | Facility: CLINIC | Age: 41
End: 2021-08-24

## 2023-04-10 NOTE — ED PROVIDER NOTE - CHIEF COMPLAINT
The patient is a 40y Female complaining of shortness of breath. Skin normal color for race, warm, dry and intact. No evidence of rash.

## 2023-04-27 ENCOUNTER — EMERGENCY (EMERGENCY)
Facility: HOSPITAL | Age: 43
LOS: 1 days | Discharge: HOME | End: 2023-04-27
Attending: STUDENT IN AN ORGANIZED HEALTH CARE EDUCATION/TRAINING PROGRAM | Admitting: STUDENT IN AN ORGANIZED HEALTH CARE EDUCATION/TRAINING PROGRAM
Payer: COMMERCIAL

## 2023-04-27 VITALS
HEART RATE: 57 BPM | SYSTOLIC BLOOD PRESSURE: 151 MMHG | TEMPERATURE: 98 F | DIASTOLIC BLOOD PRESSURE: 80 MMHG | RESPIRATION RATE: 16 BRPM | OXYGEN SATURATION: 100 %

## 2023-04-27 LAB
ALBUMIN SERPL ELPH-MCNC: 4.5 G/DL — SIGNIFICANT CHANGE UP (ref 3.3–5)
ALP SERPL-CCNC: 82 U/L — SIGNIFICANT CHANGE UP (ref 40–120)
ALT FLD-CCNC: 13 U/L — SIGNIFICANT CHANGE UP (ref 4–33)
ANION GAP SERPL CALC-SCNC: 13 MMOL/L — SIGNIFICANT CHANGE UP (ref 7–14)
APPEARANCE UR: ABNORMAL
APTT BLD: 31 SEC — SIGNIFICANT CHANGE UP (ref 27–36.3)
AST SERPL-CCNC: 12 U/L — SIGNIFICANT CHANGE UP (ref 4–32)
BACTERIA # UR AUTO: NEGATIVE — SIGNIFICANT CHANGE UP
BASE EXCESS BLDV CALC-SCNC: -0.8 MMOL/L — SIGNIFICANT CHANGE UP (ref -2–3)
BASOPHILS # BLD AUTO: 0.03 K/UL — SIGNIFICANT CHANGE UP (ref 0–0.2)
BASOPHILS NFR BLD AUTO: 0.5 % — SIGNIFICANT CHANGE UP (ref 0–2)
BILIRUB SERPL-MCNC: 0.3 MG/DL — SIGNIFICANT CHANGE UP (ref 0.2–1.2)
BILIRUB UR-MCNC: NEGATIVE — SIGNIFICANT CHANGE UP
BLOOD GAS VENOUS COMPREHENSIVE RESULT: SIGNIFICANT CHANGE UP
BUN SERPL-MCNC: 11 MG/DL — SIGNIFICANT CHANGE UP (ref 7–23)
CALCIUM SERPL-MCNC: 9.7 MG/DL — SIGNIFICANT CHANGE UP (ref 8.4–10.5)
CHLORIDE BLDV-SCNC: 104 MMOL/L — SIGNIFICANT CHANGE UP (ref 96–108)
CHLORIDE SERPL-SCNC: 105 MMOL/L — SIGNIFICANT CHANGE UP (ref 98–107)
CO2 BLDV-SCNC: 27.3 MMOL/L — HIGH (ref 22–26)
CO2 SERPL-SCNC: 22 MMOL/L — SIGNIFICANT CHANGE UP (ref 22–31)
COLOR SPEC: YELLOW — SIGNIFICANT CHANGE UP
CREAT SERPL-MCNC: 0.86 MG/DL — SIGNIFICANT CHANGE UP (ref 0.5–1.3)
DIFF PNL FLD: NEGATIVE — SIGNIFICANT CHANGE UP
EGFR: 86 ML/MIN/1.73M2 — SIGNIFICANT CHANGE UP
EOSINOPHIL # BLD AUTO: 0.05 K/UL — SIGNIFICANT CHANGE UP (ref 0–0.5)
EOSINOPHIL NFR BLD AUTO: 0.8 % — SIGNIFICANT CHANGE UP (ref 0–6)
EPI CELLS # UR: 7 /HPF — HIGH (ref 0–5)
GAS PNL BLDV: 139 MMOL/L — SIGNIFICANT CHANGE UP (ref 136–145)
GLUCOSE BLDV-MCNC: 87 MG/DL — SIGNIFICANT CHANGE UP (ref 70–99)
GLUCOSE SERPL-MCNC: 89 MG/DL — SIGNIFICANT CHANGE UP (ref 70–99)
GLUCOSE UR QL: NEGATIVE — SIGNIFICANT CHANGE UP
HCO3 BLDV-SCNC: 26 MMOL/L — SIGNIFICANT CHANGE UP (ref 22–29)
HCT VFR BLD CALC: 35.5 % — SIGNIFICANT CHANGE UP (ref 34.5–45)
HCT VFR BLDA CALC: 36 % — SIGNIFICANT CHANGE UP (ref 34.5–46.5)
HGB BLD CALC-MCNC: 12 G/DL — SIGNIFICANT CHANGE UP (ref 11.7–16.1)
HGB BLD-MCNC: 11.2 G/DL — LOW (ref 11.5–15.5)
HYALINE CASTS # UR AUTO: 3 /LPF — SIGNIFICANT CHANGE UP (ref 0–7)
IANC: 2.94 K/UL — SIGNIFICANT CHANGE UP (ref 1.8–7.4)
IMM GRANULOCYTES NFR BLD AUTO: 0.2 % — SIGNIFICANT CHANGE UP (ref 0–0.9)
INR BLD: 1.09 RATIO — SIGNIFICANT CHANGE UP (ref 0.88–1.16)
KETONES UR-MCNC: NEGATIVE — SIGNIFICANT CHANGE UP
LACTATE BLDV-MCNC: 1.2 MMOL/L — SIGNIFICANT CHANGE UP (ref 0.5–2)
LEUKOCYTE ESTERASE UR-ACNC: ABNORMAL
LIDOCAIN IGE QN: 34 U/L — SIGNIFICANT CHANGE UP (ref 7–60)
LYMPHOCYTES # BLD AUTO: 2.63 K/UL — SIGNIFICANT CHANGE UP (ref 1–3.3)
LYMPHOCYTES # BLD AUTO: 43.9 % — SIGNIFICANT CHANGE UP (ref 13–44)
MCHC RBC-ENTMCNC: 27.5 PG — SIGNIFICANT CHANGE UP (ref 27–34)
MCHC RBC-ENTMCNC: 31.5 GM/DL — LOW (ref 32–36)
MCV RBC AUTO: 87 FL — SIGNIFICANT CHANGE UP (ref 80–100)
MONOCYTES # BLD AUTO: 0.33 K/UL — SIGNIFICANT CHANGE UP (ref 0–0.9)
MONOCYTES NFR BLD AUTO: 5.5 % — SIGNIFICANT CHANGE UP (ref 2–14)
NEUTROPHILS # BLD AUTO: 2.94 K/UL — SIGNIFICANT CHANGE UP (ref 1.8–7.4)
NEUTROPHILS NFR BLD AUTO: 49.1 % — SIGNIFICANT CHANGE UP (ref 43–77)
NITRITE UR-MCNC: NEGATIVE — SIGNIFICANT CHANGE UP
NRBC # BLD: 0 /100 WBCS — SIGNIFICANT CHANGE UP (ref 0–0)
NRBC # FLD: 0 K/UL — SIGNIFICANT CHANGE UP (ref 0–0)
PCO2 BLDV: 50 MMHG — SIGNIFICANT CHANGE UP (ref 39–52)
PH BLDV: 7.32 — SIGNIFICANT CHANGE UP (ref 7.32–7.43)
PH UR: 6.5 — SIGNIFICANT CHANGE UP (ref 5–8)
PLATELET # BLD AUTO: 217 K/UL — SIGNIFICANT CHANGE UP (ref 150–400)
PO2 BLDV: 21 MMHG — LOW (ref 25–45)
POTASSIUM BLDV-SCNC: 3.4 MMOL/L — LOW (ref 3.5–5.1)
POTASSIUM SERPL-MCNC: 3.5 MMOL/L — SIGNIFICANT CHANGE UP (ref 3.5–5.3)
POTASSIUM SERPL-SCNC: 3.5 MMOL/L — SIGNIFICANT CHANGE UP (ref 3.5–5.3)
PROT SERPL-MCNC: 8 G/DL — SIGNIFICANT CHANGE UP (ref 6–8.3)
PROT UR-MCNC: ABNORMAL
PROTHROM AB SERPL-ACNC: 12.6 SEC — SIGNIFICANT CHANGE UP (ref 10.5–13.4)
RBC # BLD: 4.08 M/UL — SIGNIFICANT CHANGE UP (ref 3.8–5.2)
RBC # FLD: 13.7 % — SIGNIFICANT CHANGE UP (ref 10.3–14.5)
RBC CASTS # UR COMP ASSIST: 6 /HPF — HIGH (ref 0–4)
SAO2 % BLDV: 24.7 % — LOW (ref 67–88)
SODIUM SERPL-SCNC: 140 MMOL/L — SIGNIFICANT CHANGE UP (ref 135–145)
SP GR SPEC: 1.03 — SIGNIFICANT CHANGE UP (ref 1.01–1.05)
UROBILINOGEN FLD QL: SIGNIFICANT CHANGE UP
WBC # BLD: 5.99 K/UL — SIGNIFICANT CHANGE UP (ref 3.8–10.5)
WBC # FLD AUTO: 5.99 K/UL — SIGNIFICANT CHANGE UP (ref 3.8–10.5)
WBC UR QL: 7 /HPF — HIGH (ref 0–5)

## 2023-04-27 PROCEDURE — 99285 EMERGENCY DEPT VISIT HI MDM: CPT

## 2023-04-27 RX ORDER — KETOROLAC TROMETHAMINE 30 MG/ML
15 SYRINGE (ML) INJECTION ONCE
Refills: 0 | Status: DISCONTINUED | OUTPATIENT
Start: 2023-04-27 | End: 2023-04-27

## 2023-04-27 RX ORDER — SODIUM CHLORIDE 9 MG/ML
1000 INJECTION INTRAMUSCULAR; INTRAVENOUS; SUBCUTANEOUS ONCE
Refills: 0 | Status: COMPLETED | OUTPATIENT
Start: 2023-04-27 | End: 2023-04-27

## 2023-04-27 RX ADMIN — Medication 15 MILLIGRAM(S): at 21:03

## 2023-04-27 RX ADMIN — SODIUM CHLORIDE 1000 MILLILITER(S): 9 INJECTION INTRAMUSCULAR; INTRAVENOUS; SUBCUTANEOUS at 21:03

## 2023-04-27 NOTE — ED PROVIDER NOTE - OBJECTIVE STATEMENT
41 yo female with no significant pmhx presents to the ED for evaluation of abd pain. pt states over the last year she has noted a lump in her stomach where was presents at times. she states she saw her PMD and was told it is most likely a hernia. pt states since yesterday she started to have pain to the area. she states she went to the urgent care today and she was sent to get an US, however, she wasn't able to. she states she was also diagnosed with a  UTI while she was in the urgent care, denies urinary symptoms.

## 2023-04-27 NOTE — ED PROVIDER NOTE - ATTENDING APP SHARED VISIT CONTRIBUTION OF CARE
I have personally performed a history and physical examination of the patient and discussed management with the FUAD as well as the patient.  I reviewed the FUAD's note and agree with the documented findings and plan of care.  I have authored and modified critical sections of the Provider Note, including but not limited to HPI, Physical Exam and MDM.    43 yo female with no significant pmhx presents to the ED for evaluation of abd pain. pt states over the last year she has noted a lump in her stomach where was presents at times. she states she saw her PMD and was told it is most likely a hernia. pt states since yesterday she started to have pain to the area. she states she went to the urgent care today and she was sent to get an US, however, she wasn't able to. she states she was also diagnosed with a  UTI while she was in the urgent care, denies urinary symptoms.  Exam consistent with firm lesion in the lower abdomen.  Possible incarcerated versus strangulated hernia. Will obtain CT A/P.  Obtain CBC, CMP, VBG.  Consider pancreatitis however pain localized to lower abdomen.  Obtain lipase, TNS.  Patient has known UTI, will obtain UA.  Consider ectopic pregnancy versus pregnancy.  Obtain POC urine pregnancy.  Provide symptomatic control as needed.

## 2023-04-27 NOTE — ED PROVIDER NOTE - PHYSICAL EXAMINATION
Abd exam: firm tender area below the umbilical area. there is no redness. rest of exam is unremarkable.

## 2023-04-27 NOTE — ED PROVIDER NOTE - PATIENT PORTAL LINK FT
You can access the FollowMyHealth Patient Portal offered by [facility] by registering at the following website: [email or web address]. By joining [employer]’s FollowMyHealth portal, you will also be able to view your health information using other applications (apps) compatible with our system.

## 2023-04-27 NOTE — ED PROVIDER NOTE - PROGRESS NOTE DETAILS
Respiratory Care     04/05/18 0820   CPAP/BiPAP [facility]   Nocturnal CPAP or BiPAP CPAP   #System Evaluation Yes   Pt is currently not wearing CPAP/BiPap unit Yes  (took off 0700)      Pt reassessed at bedside, feels well, pain controlled. Informed of workup in ED, reviewed lab and/or radiology results (when applicable) with patient/caregiver. Informed pt of plan for discharge with instructions to follow up with PMD. Pt/caregiver expressed understanding of plan and agrees with plan for discharge. Strict return precautions discussed with patient in layman's terms, patient demonstrated understanding of return precautions. Flaco Schofield PA-C

## 2023-04-27 NOTE — ED PROVIDER NOTE - CLINICAL SUMMARY MEDICAL DECISION MAKING FREE TEXT BOX
41 yo female with abd pain in the setting of possible incarcerated hernia. firm and tender and unable to reduce.   pt is afebrile. no skin changes  will obtain labs, CT a/p, pain control

## 2023-04-27 NOTE — ED ADULT TRIAGE NOTE - CHIEF COMPLAINT QUOTE
Sent from urgent care for right lower abdominal pain with lump to area since yesterday. States she went to urgent care who diagnosed her with UTI. Sent her for US of abdomen but was unable to get test done at radiology facility. Denies dysuria, hematuria, n/v/d or fevers.

## 2023-04-27 NOTE — ED ADULT NURSE NOTE - OBJECTIVE STATEMENT
Patient came in with the complaints of abdominal pain. Denies nausea/vomiting and diarrhea. Specimens collected and sent. Medications given as ordered. Patient tolerated well. Nursing care continues

## 2023-04-28 VITALS
OXYGEN SATURATION: 100 % | SYSTOLIC BLOOD PRESSURE: 145 MMHG | DIASTOLIC BLOOD PRESSURE: 81 MMHG | TEMPERATURE: 99 F | HEART RATE: 57 BPM | RESPIRATION RATE: 16 BRPM

## 2023-04-28 PROCEDURE — 74177 CT ABD & PELVIS W/CONTRAST: CPT | Mod: 26,MA

## 2023-04-28 RX ORDER — MORPHINE SULFATE 50 MG/1
4 CAPSULE, EXTENDED RELEASE ORAL ONCE
Refills: 0 | Status: DISCONTINUED | OUTPATIENT
Start: 2023-04-28 | End: 2023-04-28

## 2023-04-28 RX ADMIN — MORPHINE SULFATE 4 MILLIGRAM(S): 50 CAPSULE, EXTENDED RELEASE ORAL at 02:25

## 2023-04-28 RX ADMIN — MORPHINE SULFATE 4 MILLIGRAM(S): 50 CAPSULE, EXTENDED RELEASE ORAL at 01:55

## 2023-04-28 NOTE — ED ADULT NURSE REASSESSMENT NOTE - NS ED NURSE REASSESS COMMENT FT1
Patient vitally stable. Respirations even and unlabored. Medications given as per care plan. Awaiting CT scan results.

## 2023-04-30 LAB
CULTURE RESULTS: SIGNIFICANT CHANGE UP
SPECIMEN SOURCE: SIGNIFICANT CHANGE UP

## 2023-04-30 NOTE — ED POST DISCHARGE NOTE - RESULT SUMMARY
UCX: Streptococcus agalactiae Gp B 10,000-49,000CFU/ml . ppt seen in UC prior to ED visit and todl UTI and prescribed Abx name ? message left with Call Back  P.A. number and hours for return call back.

## 2024-12-18 NOTE — ED ADULT NURSE NOTE - NS ED NURSE LEVEL OF CONSCIOUSNESS ORIENTATION
Called to inform Pt that Rx she left VM for was filled (Tramadol). No answer, left detailed message.   Oriented - self; Oriented - place; Oriented - time

## 2025-01-03 ENCOUNTER — APPOINTMENT (OUTPATIENT)
Dept: OBGYN | Facility: CLINIC | Age: 45
End: 2025-01-03